# Patient Record
Sex: MALE | Race: ASIAN | NOT HISPANIC OR LATINO | Employment: UNEMPLOYED | URBAN - METROPOLITAN AREA
[De-identification: names, ages, dates, MRNs, and addresses within clinical notes are randomized per-mention and may not be internally consistent; named-entity substitution may affect disease eponyms.]

---

## 2019-05-06 ENCOUNTER — OFFICE VISIT - RIVER FALLS (OUTPATIENT)
Dept: FAMILY MEDICINE | Facility: CLINIC | Age: 29
End: 2019-05-06

## 2019-09-03 ENCOUNTER — OFFICE VISIT - RIVER FALLS (OUTPATIENT)
Dept: FAMILY MEDICINE | Facility: CLINIC | Age: 29
End: 2019-09-03

## 2019-09-03 ASSESSMENT — MIFFLIN-ST. JEOR: SCORE: 1640.72

## 2019-10-04 ENCOUNTER — OFFICE VISIT - RIVER FALLS (OUTPATIENT)
Dept: FAMILY MEDICINE | Facility: CLINIC | Age: 29
End: 2019-10-04

## 2019-10-07 ENCOUNTER — OFFICE VISIT - RIVER FALLS (OUTPATIENT)
Dept: FAMILY MEDICINE | Facility: CLINIC | Age: 29
End: 2019-10-07

## 2019-10-07 ASSESSMENT — MIFFLIN-ST. JEOR: SCORE: 1627.48

## 2020-01-27 ENCOUNTER — OFFICE VISIT - RIVER FALLS (OUTPATIENT)
Dept: FAMILY MEDICINE | Facility: CLINIC | Age: 30
End: 2020-01-27

## 2020-01-27 ASSESSMENT — MIFFLIN-ST. JEOR: SCORE: 1613.51

## 2020-11-06 ENCOUNTER — OFFICE VISIT - RIVER FALLS (OUTPATIENT)
Dept: FAMILY MEDICINE | Facility: CLINIC | Age: 30
End: 2020-11-06

## 2021-03-01 ENCOUNTER — OFFICE VISIT - RIVER FALLS (OUTPATIENT)
Dept: FAMILY MEDICINE | Facility: CLINIC | Age: 31
End: 2021-03-01

## 2021-03-01 ASSESSMENT — MIFFLIN-ST. JEOR: SCORE: 1559.07

## 2021-03-22 ENCOUNTER — COMMUNICATION - RIVER FALLS (OUTPATIENT)
Dept: FAMILY MEDICINE | Facility: CLINIC | Age: 31
End: 2021-03-22

## 2021-04-07 ENCOUNTER — AMBULATORY - RIVER FALLS (OUTPATIENT)
Dept: FAMILY MEDICINE | Facility: CLINIC | Age: 31
End: 2021-04-07

## 2021-05-05 ENCOUNTER — AMBULATORY - RIVER FALLS (OUTPATIENT)
Dept: FAMILY MEDICINE | Facility: CLINIC | Age: 31
End: 2021-05-05

## 2021-06-02 ENCOUNTER — OFFICE VISIT - RIVER FALLS (OUTPATIENT)
Dept: FAMILY MEDICINE | Facility: CLINIC | Age: 31
End: 2021-06-02

## 2021-06-02 ASSESSMENT — MIFFLIN-ST. JEOR: SCORE: 1600.8

## 2021-12-06 ENCOUNTER — OFFICE VISIT - RIVER FALLS (OUTPATIENT)
Dept: FAMILY MEDICINE | Facility: CLINIC | Age: 31
End: 2021-12-06

## 2021-12-08 ENCOUNTER — TRANSCRIBE ORDERS (OUTPATIENT)
Dept: OTHER | Age: 31
End: 2021-12-08

## 2021-12-08 DIAGNOSIS — U09.9 COVID-19 LONG HAULER: Primary | ICD-10-CM

## 2021-12-14 ENCOUNTER — OFFICE VISIT - RIVER FALLS (OUTPATIENT)
Dept: FAMILY MEDICINE | Facility: CLINIC | Age: 31
End: 2021-12-14

## 2021-12-14 ASSESSMENT — MIFFLIN-ST. JEOR: SCORE: 1546.37

## 2021-12-20 ENCOUNTER — COMMUNICATION - RIVER FALLS (OUTPATIENT)
Dept: FAMILY MEDICINE | Facility: CLINIC | Age: 31
End: 2021-12-20

## 2022-02-11 VITALS
HEIGHT: 66 IN | SYSTOLIC BLOOD PRESSURE: 120 MMHG | WEIGHT: 163.08 LBS | DIASTOLIC BLOOD PRESSURE: 80 MMHG | TEMPERATURE: 97.5 F | HEART RATE: 76 BPM | HEIGHT: 66 IN | SYSTOLIC BLOOD PRESSURE: 120 MMHG | WEIGHT: 166 LBS | BODY MASS INDEX: 26.21 KG/M2 | HEART RATE: 118 BPM | TEMPERATURE: 98.7 F | BODY MASS INDEX: 26.68 KG/M2 | OXYGEN SATURATION: 97 % | DIASTOLIC BLOOD PRESSURE: 78 MMHG

## 2022-02-11 VITALS
HEART RATE: 86 BPM | TEMPERATURE: 97.6 F | SYSTOLIC BLOOD PRESSURE: 126 MMHG | DIASTOLIC BLOOD PRESSURE: 72 MMHG | WEIGHT: 165 LBS

## 2022-02-11 VITALS
HEART RATE: 144 BPM | HEIGHT: 66 IN | SYSTOLIC BLOOD PRESSURE: 143 MMHG | TEMPERATURE: 98.9 F | DIASTOLIC BLOOD PRESSURE: 96 MMHG | BODY MASS INDEX: 23.33 KG/M2 | WEIGHT: 145.2 LBS

## 2022-02-11 VITALS
HEART RATE: 115 BPM | OXYGEN SATURATION: 98 % | WEIGHT: 157.2 LBS | SYSTOLIC BLOOD PRESSURE: 102 MMHG | BODY MASS INDEX: 25.26 KG/M2 | TEMPERATURE: 97.5 F | HEIGHT: 66 IN | DIASTOLIC BLOOD PRESSURE: 76 MMHG

## 2022-02-11 VITALS
BODY MASS INDEX: 25.71 KG/M2 | RESPIRATION RATE: 16 BRPM | HEIGHT: 66 IN | TEMPERATURE: 97.8 F | HEART RATE: 100 BPM | WEIGHT: 160 LBS | DIASTOLIC BLOOD PRESSURE: 70 MMHG | OXYGEN SATURATION: 98 % | SYSTOLIC BLOOD PRESSURE: 102 MMHG

## 2022-02-11 VITALS
WEIGHT: 148 LBS | HEART RATE: 70 BPM | DIASTOLIC BLOOD PRESSURE: 62 MMHG | BODY MASS INDEX: 23.78 KG/M2 | HEIGHT: 66 IN | SYSTOLIC BLOOD PRESSURE: 110 MMHG

## 2022-02-16 NOTE — PROGRESS NOTES
Chief Complaint    c/o infection in mouth; tingling in left arm  History of Present Illness      Kareen is a young man with anxiety disorder who presents with a number of complaints today.  He is worried about gum disease after seeing his dentist and subsequently periodontist in the last 2 weeks.  He was told to improve his oral hygiene which is sometimes a problem when he is depressed, does not take care of hygiene as he should.  He had a mild intermittent tingling in the left arm without weakness.  He does have a little neck stiffness but no pain.  No injuries.  No headache weakness or other neurologic complaints.  He smokes and is concerned because he is been having a cough with purulent sputum production.  Is very concerned about his gum disease and his perception that he was not told what to do about it.  Review of Systems      No chest pain, dyspnea, edema, fever, chills, or bowel or bladder symptoms.  Physical Exam   Vitals & Measurements    T: 97.5   F (Tympanic)  HR: 118(Peripheral)  BP: 120/80  SpO2: 97%     HT: 66 in  WT: 163.08 lb  BMI: 26.32       Patient is a very animated young man in no.  Alert and oriented.  Thinking seems normal.  Does not describe any manic symptoms.  HEENT exam does reveal an area of gum disease left upper without associated cellulitis.  Neck is supple no adenopathy or thyromegaly.  Some muscle tenderness bilaterally.  Chest clear.  On neurologic exam strength and sensation are intact throughout both arms.  Assessment/Plan       Acute bronchitis (J20.9)         Antibiotics given his smoking history.         Ordered:          46076 office outpatient visit 25 minutes (Charge), Quantity: 1, Numbness and tingling in left arm  Gum disease  Acute bronchitis  Tobacco Use Disorder, Continuous                Gum disease (K06.9)         Defer to his dentists.         Ordered:          89250 office outpatient visit 25 minutes (Charge), Quantity: 1, Numbness and tingling in left arm  Gum  disease  Acute bronchitis  Tobacco Use Disorder, Continuous                Numbness and tingling in left arm (R20.0)         I cannot demonstrate anything to exam but certainly disc herniation is possible.  I reviewed this with him.  We discussed the option of imaging with an MRI versus giving this a little more time and he prefers the latter option.  Return if there is weakness or is not promptly better.         Ordered:          11802 office outpatient visit 25 minutes (Charge), Quantity: 1, Numbness and tingling in left arm  Gum disease  Acute bronchitis  Tobacco Use Disorder, Continuous                Tobacco Use Disorder, Continuous (F17.209)        Encouraged the patient to quit smoking.          Ordered:          67286 office outpatient visit 25 minutes (Charge), Quantity: 1, Numbness and tingling in left arm  Gum disease  Acute bronchitis  Tobacco Use Disorder, Continuous                Orders:         amoxicillin, = 1 cap(s) ( 500 mg ), Oral, tid, x 10 day(s), # 30 cap(s), 0 Refill(s), Type: Acute, Pharmacy: [facility] #[id number], 1 cap(s) Oral tid,x10 day(s), (Ordered)  Patient Information     Name:TAL LO      Address:      [address] [place] [phone]     Sex:Male     YOB: 1990     [phone]     Emergency Contact:LUCIANA LO     MRN:[id number]     FIN:[id number]     Location:[facility]     Date of Service:10/07/2019      Primary Care Physician:       Brian Jarvis MD, [phone]      Attending Physician:       Iraj Wilder MD, [phone]  Problem List/Past Medical History    Ongoing     Anxiety     Bipolar Disorder, Unspecified     Insomnia     Tobacco Use Disorder, Continuous    Historical     No qualifying data  Medications    amoxicillin 500 mg oral capsule, 500 mg= 1 cap(s), Oral, tid    citalopram 40 mg oral tablet, 40 mg= 1 tab(s), Oral, daily, 1 refills  Allergies    Tegretol  Social History     Smoking Status - 02/23/2011     Yes     Alcohol - Current, 02/01/2011     Exercise      Exercise type: Running., 02/01/2011     Other      Marital Status, Single, 02/01/2011     Tobacco - Current, 02/01/2011  Immunizations      Vaccine Date Status      tetanus/diphth/pertuss (Tdap) adult/adol 04/20/2017 Recorded      Td 06/23/2004 Recorded      Hep B 03/21/2001 Recorded      Hep B 10/24/2000 Recorded      Hep B 09/19/2000 Recorded

## 2022-02-16 NOTE — NURSING NOTE
Comprehensive Intake Entered On:  9/3/2019 3:36 PM CDT    Performed On:  9/3/2019 3:29 PM CDT by Mae Vaughn LPN               Summary   Chief Complaint :   refills for citalopram. would like ears checked, feel plugged.    Weight Measured :   166 lb(Converted to: 166 lb 0 oz, 75.30 kg)    Height Measured :   66 in(Converted to: 5 ft 6 in, 167.64 cm)    Body Mass Index :   26.79 kg/m2 (HI)    Body Surface Area :   1.87 m2   Systolic Blood Pressure :   120 mmHg   Diastolic Blood Pressure :   78 mmHg   Mean Arterial Pressure :   92 mmHg   Peripheral Pulse Rate :   76 bpm   BP Site :   Right arm   Pulse Site :   Radial artery   BP Method :   Manual   HR Method :   Manual   Temperature Tympanic :   98.7 DegF(Converted to: 37.1 DegC)    Mae Vaughn LPN - 9/3/2019 3:29 PM CDT   Health Status   Allergies Verified? :   Yes   Medication History Verified? :   Yes   Pre-Visit Planning Status :   Completed   Mae Vaughn LPN - 9/3/2019 3:29 PM CDT   Consents   Consent for Immunization Exchange :   Consent Granted   Consent for Immunizations to Providers :   Consent Granted   Mae Vaughn LPN - 9/3/2019 3:29 PM CDT   Meds / Allergies   (As Of: 9/3/2019 3:36:18 PM CDT)   Allergies (Active)   Tegretol  Estimated Onset Date:   Unspecified ; Created By:   April Meyer; Reaction Status:   Active ; Category:   Drug ; Substance:   Tegretol ; Type:   Allergy ; Updated By:   April Meyer; Reviewed Date:   9/3/2019 3:33 PM CDT        Medication List   (As Of: 9/3/2019 3:36:18 PM CDT)   Prescription/Discharge Order    citalopram  :   citalopram ; Status:   Prescribed ; Ordered As Mnemonic:   citalopram 40 mg oral tablet ; Simple Display Line:   40 mg, 1 tab(s), PO, Daily, 30 tab(s), 2 Refill(s) ; Ordering Provider:   Brian Jarvis MD; Catalog Code:   citalopram ; Order Dt/Tm:   5/6/2019 2:43:36 PM

## 2022-02-16 NOTE — TELEPHONE ENCOUNTER
"Entered by Charleen Mckeon RN on September 29, 2020 11:22:51 AM CDT  ---------------------  From: Charleen Mckeon RN   To: [facility] #[id number]    Sent: 9/29/2020 11:22:51 AM CDT  Subject: Medication Management     ** Submitted: **  Order:citalopram (citalopram 40 mg oral tablet)  1 tab(s)  Oral  daily  Qty:  30 tab(s)        Days Supply:  30        Refills:  0          Substitutions Allowed     Route To [facility] #[id number]    Signed by Charleen Mckeon RN  9/29/2020 4:22:00 PM [facility]    ** Submitted: **  Complete:citalopram (citalopram 40 mg oral tablet)   Signed by Charleen Mckeon RN  9/29/2020 4:22:00 PM [facility]    ** Not Approved:  **  citalopram (CITALOPRAM 40MG TABLETS)  TAKE 1 TABLET BY MOUTH DAILY  Qty:  30 tab(s)        Days Supply:  30        Refills:  0          Substitutions Allowed     Route To Pharmacy - [facility] #[id number]   Signed by Charleen Mckeon RN            ------------------------------------------  From: [facility] #[id number]  To: Brian Jarvis MD  Sent: September 27, 2020 12:51:48 PM CDT  Subject: Medication Management  Due: September 9, 2020 4:20:39 PM CDT     ** On Hold Pending Signature **     Dispensed Drug: citalopram (citalopram 40 mg oral tablet), TAKE 1 TABLET BY MOUTH DAILY  Quantity: 30 tab(s)  Days Supply: 30  Refills: 0  Substitutions Allowed  Notes from Pharmacy:  ------------------------------------------Patient is past due for med check with [facility]. Placed RTC.  Tried to call patient but listed number \"is not taking calls at this time.\"  Filled 30 day supply. Sent message to Pharmacy.  "

## 2022-02-16 NOTE — NURSING NOTE
Generalized Anxiety Disorder Screening Entered On:  3/1/2021 11:15 AM CST    Performed On:  3/1/2021 11:14 AM CST by Mae Vaughn LPN               Generalized Anxiety Disorder Screening   MIMI Nervous, Anxious On Edge :   Nearly every day   MIMI Control Worrying B :   Nearly every day   MIMI Worrying Too Much :   Nearly every day   MIMI Trouble Relaxing :   Nearly every day   MIMI Restless :   Nearly every day   MIMI Easily Annoyed/Irritable :   Nearly every day   MIMI Afraid :   Nearly every day   MIMI Total Screening Score :   21    MIMI Difficulty with Work, Home, Others :   Extremely difficult   Mae Vaughn LPN - 3/1/2021 11:14 AM CST

## 2022-02-16 NOTE — NURSING NOTE
Comprehensive Intake Entered On:  5/6/2019 2:24 PM CDT    Performed On:  5/6/2019 2:18 PM CDT by Mae Vaughn LPN               Summary   Chief Complaint :   Needs refills of Citalopram. Has been taking 40mg daily.    Weight Measured :   165 lb(Converted to: 165 lb 0 oz, 74.84 kg)    Systolic Blood Pressure :   126 mmHg   Diastolic Blood Pressure :   72 mmHg   Mean Arterial Pressure :   90 mmHg   Peripheral Pulse Rate :   86 bpm   BP Site :   Right arm   Pulse Site :   Radial artery   BP Method :   Manual   HR Method :   Manual   Temperature Tympanic :   97.6 DegF(Converted to: 36.4 DegC)  (LOW)    Mae Vaughn LPN - 5/6/2019 2:18 PM CDT   Health Status   Allergies Verified? :   Yes   Medication History Verified? :   Yes   Mae Vaughn LPN - 5/6/2019 2:18 PM CDT   Consents   Consent for Immunization Exchange :   Consent Granted   Consent for Immunizations to Providers :   Consent Granted   Mae Vaughn LPN - 5/6/2019 2:18 PM CDT   Meds / Allergies   (As Of: 5/6/2019 2:24:33 PM CDT)   Allergies (Active)   Tegretol  Estimated Onset Date:   Unspecified ; Created By:   April Meyer; Reaction Status:   Active ; Category:   Drug ; Substance:   Tegretol ; Type:   Allergy ; Updated By:   April Meyer; Reviewed Date:   5/6/2019 2:21 PM CDT        Medication List   (As Of: 5/6/2019 2:24:33 PM CDT)   Prescription/Discharge Order    citalopram  :   citalopram ; Status:   Prescribed ; Ordered As Mnemonic:   citalopram 20 mg oral tablet ; Simple Display Line:   40 mg, 2 tab(s), PO, Daily, 30 tab(s), 2 Refill(s) ; Ordering Provider:   Brian Jarvis MD; Catalog Code:   citalopram ; Order Dt/Tm:   2/23/2011 11:17:33 AM          quetiapine  :   quetiapine ; Status:   Completed ; Ordered As Mnemonic:   Seroquel 100 mg oral tablet ; Simple Display Line:   See Instructions, 1 tab(s) po at noon and 2 tabs po at bedtime - patient is a [facility] patient, 90 tab(s) ; Ordering Provider:   Radha FIERRO, [place];  Catalog Code:   QUEtiapine ; Order Dt/Tm:   2/23/2011 11:16:58 AM

## 2022-02-16 NOTE — PROGRESS NOTES
Patient:   TAL LO            MRN: [id number]            FIN: [id number]               Age:   31 years     Sex:  Male     :  1990   Associated Diagnoses:   Personality disorder; Sinusitis nasal; Anxiety; COVID-19 long hauler; Bipolar Disorder, Unspecified   Author:   Rico Dalton PA-C      Report Summary   Diagnosis  COVID-19 long hauler (VRX96-TQ U09.9).  Patient InstructionsOrders   Visit Information      Date of Service: 2021 09:20 am  Performing Location: [facility]  Encounter#: [id number]      Primary Care Provider (PCP):  [id number] -UNKNOWN,    NPI# [id number]      Referring Provider:  Rico Dalton PA-C    NPI# [id number]   Visit type:  Video Visit via Mor[employer] or iBuyitBetter.    Participants in room during visit:  2   Location of patient:  :Home  Location of provider:  _ (Clinic office )  Video Start Time:  1045  Video End Time:   1056    Today's visit was conducted via video conference due to the COVID-19 pandemic.  The patient's consent to proceed with a video visit has been obtained and documented.      Chief Complaint   Long Covid symptoms. Sinus infection symptoms acutely.       History of Present Illness   Patient is a 31 year old M who is being evaluated via a billable video visit. One week history of sinus symptoms. Longer term post Covid symptoms. HA, mental cloudiness, fatigue, nausea, ongoing since Covid many months ago.      Review of Systems   Constitutional:  Weakness, Fatigue.    Eye:  Negative.    Ear/Nose/Mouth/Throat:  Nasal congestion.    Respiratory:  Negative.    Cardiovascular:  Negative.    Gastrointestinal:  Negative.    Genitourinary:  Negative.    Immunologic:  Negative.    Musculoskeletal:  Negative.    Integumentary:  Negative.    Neurologic:  Confusion, Headache.    Psychiatric:  Negative.       Health Status   Allergies:    Allergic Reactions (Selected)  Severity Not Documented  Tegretol (No reactions were documented)   Medications:   (Selected)   Prescriptions  Prescribed  albuterol 90 mcg/inh inhalation aerosol: 2 puff(s), Inhale, q4 hrs, PRN: wheezing, # 1 EA, 3 Refill(s), Type: Maintenance, Pharmacy: [facility] #[id number], 2 puff(s) Inhale q4 hrs,PRN:wheezing, 66, in, 06/02/21 10:11:00 CDT, Height Measured, 157.2, lb, 06/02/21 10:11:00 CDT, Weight Winsome...  amoxicillin-clavulanate 875 mg-125 mg oral tablet: = 1 tab(s), Oral, q12 hrs, x 10 day(s), # 20 tab(s), 0 Refill(s), Type: Acute, Pharmacy: [facility] #[id number], 1 tab(s) Oral q12 hrs,x10 day(s), 66, in, 06/02/21 10:11:00 CDT, Height Measured, 157.2, lb, 06/02/21 10:11:00 CDT, Weight Measured  spacer for inhaler: spacer for inhaler, See Instructions, Instructions: use with albuterol inhaler, Supply, # 1 EA, 0 Refill(s), Type: Maintenance, Pharmacy: [facility] #[id number], use with albuterol inhaler   Problem list:    All Problems  Tobacco Use Disorder, Continuous / ICD-9-.1 / Confirmed  Personality disorder / SNOMED CT 69481667 / Confirmed  Insomnia / ICD-9-.52 / Confirmed  Bipolar Disorder, Unspecified / ICD-9-.80 / Confirmed  Bipolar affective disorder, current episode manic / SNOMED CT 527489352 / Confirmed  Anxiety / ICD-9-.00 / Confirmed      Histories   Past Medical History:    Active  Insomnia (780.52)  Bipolar Disorder, Unspecified (296.80)  Anxiety (300.00)  Tobacco Use Disorder, Continuous (305.1)   Family History:    No family history items have been selected or recorded.   Procedure history:    No active procedure history items have been selected or recorded.   Social History:        Electronic Cigarette/Vaping Assessment            Electronic Cigarette Use: Never.      Alcohol Assessment: Current      Tobacco Assessment: Current            5-9 cigarettes (between 1/4 to 1/2 pack)/day in last 30 days, Cigarettes, 13 year(s).      Exercise and Physical Activity Assessment            Exercise type: Running.      Other Assessment             Marital Status, Single        Physical Examination   General:  Alert and oriented, No acute distress.    Eye:  Pupils are equal, round and reactive to light, Normal conjunctiva.    HENT:  Oral mucosa is moist.    Neck:  Supple.    Respiratory:  Respirations are non-labored.    Psychiatric:  Cooperative, Appropriate mood & affect, Normal judgment.       Impression and Plan   Diagnosis     Personality disorder (QVM65-HL F60.9).     Sinusitis nasal (CBT55-PX J32.9).     Anxiety (DIU76-CD F41.9).     COVID-19 long hauler (PMD81-EO U09.9).     Bipolar Disorder, Unspecified (GCL21-EZ F31.9).     Patient Instructions:       Counseled: Patient, Regarding diagnosis, Regarding treatment, Regarding medications, Verbalized understanding.    Orders     Orders (Selected)   Outpatient Orders  Ordered  Referral (Request): 12/06/21 10:54:00 CST, Referred to: Other, Referred to: [facility], Reason for referral: Long Covid symptoms, COVID-19 long hauler  Prescriptions  Prescribed  amoxicillin-clavulanate 875 mg-125 mg oral tablet: = 1 tab(s), Oral, q12 hrs, x 10 day(s), # 20 tab(s), 0 Refill(s), Type: Acute, Pharmacy: [facility] #[id number], 1 tab(s) Oral q12 hrs,x10 day(s), 66, in, 06/02/21 10:11:00 CDT, Height Measured, 157.2, lb, 06/02/21 10:11:00 CDT, Weight Measured.     Take medicine as prescribed, side effects discussed.  Tylenol/ibuprofen for fever and discomfort.  Push fluids.  RTC if not improving in 36-48 hours, prior if concerns as we have discussed.        Health Maintenance      Recommendations     Pending (in the next year)        OverDue           Influenza Vaccine due  09/01/21  and every 1  year(s)        Due            HIV Screen (if sexually active) due  12/06/21  and every 1  year(s)           STD Counseling (if sexually active) due  12/06/21  and every 1  year(s)           Syphilis Screen (if sexually active) due  12/06/21  and every 1  year(s)        Due In Future            Alcohol Misuse Screen not  due until  03/01/22  and every 1  year(s)           Depression Screen not due until  03/01/22  and every 1  year(s)           Body Mass Index Check not due until  06/02/22  and every 1  year(s)           High Blood Pressure Screen not due until  06/02/22  and every 1  year(s)     Satisfied (in the past 1 year)        Satisfied            Alcohol Misuse Screen on  03/01/21.           Alcohol Misuse Screen on  03/01/21.           Body Mass Index Check on  06/02/21.           Body Mass Index Check on  03/01/21.           COVID-19 Vaccine (Moderna) Dose 2 on  05/05/21.           COVID-19 Vaccine (Moderna) Dose 1 on  04/07/21.           Depression Screen on  03/01/21.           Depression Screen on  03/01/21.           Depression Screen on  03/01/21.           High Blood Pressure Screen on  06/02/21.           High Blood Pressure Screen on  03/01/21.

## 2022-02-16 NOTE — NURSING NOTE
Comprehensive Intake Entered On:  6/2/2021 10:16 AM CDT    Performed On:  6/2/2021 10:11 AM CDT by Martita Chambers LPN               Summary   Chief Complaint :   lungs hurt, diarrhea for months, bodyaches, HA, nausea, vomiting, coughing, unsure if any fever, chills, brain fog, numbness of right shoulder and upper arm, says that he has had symptoms for the past two months   Weight Measured :   157.2 lb(Converted to: 157 lb 3 oz, 71.305 kg)    Height Measured :   66 in(Converted to: 5 ft 6 in, 167.64 cm)    Body Mass Index :   25.37 kg/m2 (HI)    Body Surface Area :   1.82 m2   Systolic Blood Pressure :   102 mmHg   Diastolic Blood Pressure :   76 mmHg   Mean Arterial Pressure :   85 mmHg   Peripheral Pulse Rate :   115 bpm (HI)    BP Site :   Right arm   BP Method :   Manual   Temperature Tympanic :   97.5 DegF(Converted to: 36.4 DegC)  (LOW)    Oxygen Saturation :   98 %   Martita Chambers LPN - 6/2/2021 10:11 AM CDT   Health Status   Allergies Verified? :   Yes   Medication History Verified? :   Yes   Medical History Verified? :   No   Pre-Visit Planning Status :   Completed   Tobacco Use? :   Current every day smoker   Martita Chambers LPN - 6/2/2021 10:11 AM CDT   Meds / Allergies   (As Of: 6/2/2021 10:16:43 AM CDT)   Allergies (Active)   Tegretol  Estimated Onset Date:   Unspecified ; Created By:   April Meyer; Reaction Status:   Active ; Category:   Drug ; Substance:   Tegretol ; Type:   Allergy ; Updated By:   April Meyer; Reviewed Date:   3/1/2021 10:41 AM CST        Medication List   (As Of: 6/2/2021 10:16:43 AM CDT)   Prescription/Discharge Order    Miscellaneous Rx Supply  :   Miscellaneous Rx Supply ; Status:   Prescribed ; Ordered As Mnemonic:   spacer for inhaler ; Simple Display Line:   See Instructions, use with albuterol inhaler, 1 EA, 0 Refill(s) ; Ordering Provider:   Troy Crisostomo PA-C; Catalog Code:   Miscellaneous Rx Supply ; Order Dt/Tm:   1/27/2020 11:02:06 AM CST           albuterol  :   albuterol ; Status:   Prescribed ; Ordered As Mnemonic:   albuterol 90 mcg/inh inhalation aerosol ; Simple Display Line:   See Instructions, INHALE 2 PUFFS EVERY 4 HOURS AS NEEDED FOR WHEEZING. USE WITH SPACER., 18 gm, 3 Refill(s) ; Ordering Provider:   Iraj Wilder MD; Catalog Code:   albuterol ; Order Dt/Tm:   12/22/2020 8:40:51 AM CST          guaiFENesin  :   guaiFENesin ; Status:   Prescribed ; Ordered As Mnemonic:   Mucinex 600 mg oral tablet, extended release ; Simple Display Line:   600 mg, 1 tab(s), Oral, q12 hrs, 20 tab(s), 0 Refill(s) ; Ordering Provider:   Troy Crisostomo PA-C; Catalog Code:   guaiFENesin ; Order Dt/Tm:   1/27/2020 11:03:28 AM CST          lurasidone  :   lurasidone ; Status:   Prescribed ; Ordered As Mnemonic:   Latuda 40 mg oral tablet ; Simple Display Line:   40 mg, 1 tab(s), Oral, daily, 30 tab(s), 2 Refill(s) ; Ordering Provider:   Brian Jarvis MD; Catalog Code:   lurasidone ; Order Dt/Tm:   3/22/2021 2:41:18 PM CDT          DULoxetine  :   DULoxetine ; Status:   Prescribed ; Ordered As Mnemonic:   DULoxetine 20 mg oral delayed release capsule ; Simple Display Line:   20 mg, 1 cap(s), Oral, daily, 30 cap(s), 2 Refill(s) ; Ordering Provider:   Brian Jarvis MD; Catalog Code:   DULoxetine ; Order Dt/Tm:   3/22/2021 2:41:18 PM CDT            ID Risk Screen   Recent Travel History :   No recent travel   Family Member Travel History :   No recent travel   Other Exposure to Infectious Disease :   Unknown   COVID-19 Testing Status :   No positive COVID-19 test   Martita Chambers LPN - 6/2/2021 10:11 AM CDT

## 2022-02-16 NOTE — NURSING NOTE
Comprehensive Intake Entered On:  10/7/2019 2:21 PM CDT    Performed On:  10/7/2019 2:17 PM CDT by Lora Barone               Summary   Chief Complaint :   c/o infection in mouth; right tingling in arm   Weight Measured :   163.08 lb(Converted to: 163 lb 1 oz, 73.97 kg)    Height Measured :   66 in(Converted to: 5 ft 6 in, 167.64 cm)    Body Mass Index :   26.32 kg/m2 (HI)    Body Surface Area :   1.85 m2   Systolic Blood Pressure :   120 mmHg   Diastolic Blood Pressure :   80 mmHg   Mean Arterial Pressure :   93 mmHg   Peripheral Pulse Rate :   118 bpm (HI)    BP Site :   Right arm   BP Method :   Manual   HR Method :   Electronic   Temperature Tympanic :   97.5 DegF(Converted to: 36.4 DegC)  (LOW)    Oxygen Saturation :   97 %   Lora Barone - 10/7/2019 2:17 PM CDT   Health Status   Allergies Verified? :   Yes   Medication History Verified? :   Yes   Medical History Verified? :   Yes   Pre-Visit Planning Status :   Completed   Lora Barone - 10/7/2019 2:17 PM CDT   Consents   Consent for Immunization Exchange :   Consent Granted   Consent for Immunizations to Providers :   Consent Granted   Lora Barone - 10/7/2019 2:17 PM CDT   Meds / Allergies   (As Of: 10/7/2019 2:21:55 PM CDT)   Allergies (Active)   Tegretol  Estimated Onset Date:   Unspecified ; Created By:   April Meyer; Reaction Status:   Active ; Category:   Drug ; Substance:   Tegretol ; Type:   Allergy ; Updated By:   April Meyer; Reviewed Date:   10/7/2019 2:19 PM CDT        Medication List   (As Of: 10/7/2019 2:21:55 PM CDT)   Prescription/Discharge Order    citalopram  :   citalopram ; Status:   Prescribed ; Ordered As Mnemonic:   citalopram 40 mg oral tablet ; Simple Display Line:   40 mg, 1 tab(s), PO, Daily, 90 tab(s), 1 Refill(s) ; Ordering Provider:   Brian Jarvis MD; Catalog Code:   citalopram ; Order Dt/Tm:   9/3/2019 4:08:34 PM CDT

## 2022-02-16 NOTE — TELEPHONE ENCOUNTER
Entered by Lora Barone on July 22, 2020 8:14:17 AM CDT  PCP:   REMI    Medication:   Citalopram 40mg   Last Filled:  4/13/20   Quantity:  30 Refills:  0    Date of last office visit and reason:   1/27/20 Pneumonia   Date of last labs pertaining to condition:  _    Note:  Please advise. Patient has not had this med for a couple months     Return to Clinic order placed?  Yes. Over due for med check.     Resource:   _  Phone:   _            ------------------------------------------  From: [facility] #[id number]  To: Brian Jarvis MD  Sent: July 20, 2020 1:01:03 PM CDT  Subject: Medication Management  Due: July 21, 2020 12:31:28 PM CDT     ** On Hold Pending Signature **     Dispensed Drug: citalopram (citalopram 40 mg oral tablet), TAKE 1 TABLET BY MOUTH DAILY  Quantity: 30 tab(s)  Days Supply: 30  Refills: 0  Substitutions Allowed  Notes from Pharmacy:  ---------------------------------------------------------------  From: Lora Barone (eRx Pool (32224_[facility]))   To: [employer] Message Pool (32224_[place] [facility] [place]);     Sent: 7/22/2020 8:14:21 AM CDT  Subject: FW: Medication Management   Due Date/Time: 7/21/2020 1:01:00 PM CDT---------------------  From: Mae Vaughn LPN ([employer] Message Pool (32224_[facility]))   To: Brian Jarvis MD;     Sent: 7/22/2020 8:16:19 AM CDT  Subject: FW: Medication Management   Due Date/Time: 7/21/2020 1:01:00 PM CDT---------------------  From: Brian Jarvis MD   To: [facility] #[id number]    Sent: 7/22/2020 1:07:35 PM CDT  Subject: FW: Medication Management     ** Submitted: **  Complete:citalopram (citalopram 40 mg oral tablet)   Signed by Brian Jarvis MD  7/22/2020 6:07:00 PM [facility]    ** Approved with modifications: **  citalopram (CITALOPRAM 40MG TABLETS)  TAKE 1 TABLET BY MOUTH DAILY  Qty:  30 tab(s)        Days Supply:  30        Refills:  2          Substitutions Allowed     Route To Pharmacy - [facility] #[id number]

## 2022-02-16 NOTE — TELEPHONE ENCOUNTER
Entered by Virginie Rincon CMA on December 22, 2020 8:41:06 AM CST  ---------------------  From: Virginie Rincon CMA   To: [facility] #[id number]    Sent: 12/22/2020 8:41:05 AM CST  Subject: Medication Management     ** Submitted: **  Order:albuterol (albuterol 90 mcg/inh inhalation aerosol)  See Instructions  INHALE 2 PUFFS EVERY 4 HOURS AS NEEDED FOR WHEEZING. USE WITH SPACER.  Qty:  18 gm        Days Supply:  16        Refills:  3          Substitutions Allowed     Route To [facility] #[id number]    Signed by Virginie Rincon CMA  12/22/2020 2:39:00 PM [facility]    ** Submitted: **  Complete:albuterol (albuterol 90 mcg/inh inhalation aerosol)   Signed by Virginie Rincon CMA  12/22/2020 2:41:00 PM [facility]    ** Not Approved:  **  albuterol (ALBUTEROL HFA INH(200 PUFFS)18GM)  INHALE 2 PUFFS EVERY 4 HOURS AS NEEDED FOR WHEEZING. USE WITH SPACER.  Qty:  18 gm        Days Supply:  16        Refills:  0          Substitutions Allowed     Route To [facility] #[id number]   Signed by Virginie Rincon CMA          last seen 11/6  [facility] 5/2021      ------------------------------------------  From: [facility] #[id number]  To: Iraj Wilder MD  Sent: December 20, 2020 3:43:20 AM CST  Subject: Medication Management  Due: December 16, 2020 8:25:31 PM CST     ** On Hold Pending Signature **     Dispensed Drug: albuterol (albuterol 90 mcg/inh inhalation aerosol), INHALE 2 PUFFS EVERY 4 HOURS AS NEEDED FOR WHEEZING. USE WITH SPACER.  Quantity: 18 gm  Days Supply: 16  Refills: 0  Substitutions Allowed  Notes from Pharmacy:  ------------------------------------------

## 2022-02-16 NOTE — LETTER
(Inserted Image. Unable to display)     July 08, 2019      TAL LO  [address] [place], [place] [id number]          Dear TAL,      Thank you for selecting [facility] ([facility]viously [facility]) for your healthcare needs.      Our records indicate you are due for the following services:     Follow-up office visit / Medication Check.      To schedule an appointment or if you have further questions, please contact your primary clinic:   [facility]       [phone]   [facility]       [phone]              [facility]     [phone]      Powered by [employer]    Sincerely,    Brian Jarvis MD

## 2022-02-16 NOTE — PROGRESS NOTES
Patient:   TAL LO            MRN: [id number]            FIN: [id number]               Age:   29 years     Sex:  Male     :  1990   Associated Diagnoses:   Pneumonia   Author:   Troy Crisostomo PA-C      Chief Complaint   2020 10:48 AM CST   Pt here for productive cough x week.        History of Present Illness   Chief complaint and symptoms noted above and confirmed with patient   productive cough for a week, has had chills and sweats  using cough suppressant, cough drops, albuterol inhaler  still smoking a few cigarettes daily         Review of Systems   Constitutional:  Chills, Sweats.    Ear/Nose/Mouth/Throat:  Nasal congestion, Sore throat.    Respiratory:  Cough, Sputum production, Wheezing.    Gastrointestinal:  Diarrhea, No vomiting.       Health Status   Allergies:    Allergic Reactions (All)  Severity Not Documented  Tegretol (No reactions were documented)   Medications:  (Selected)   Prescriptions  Prescribed  citalopram 40 mg oral tablet: = 1 tab(s) ( 40 mg ), PO, Daily, # 90 tab(s), 1 Refill(s), Type: Maintenance, Pharmacy: [facility] #[id number], 1 tab(s) Oral daily  Documented Medications  Documented  albuterol 90 mcg/inh inhalation aerosol: 2 puff(s), Inhale, q6 hrs, 0 Refill(s), Type: Maintenance   Problem list:    All Problems  Bipolar Disorder, Unspecified / ICD-9-.80 / Confirmed  Anxiety / ICD-9-.00 / Confirmed  Tobacco Use Disorder, Continuous / ICD-9-.1 / Confirmed  Insomnia / ICD-9-.52 / Confirmed      Histories   Past Medical History:    Active  Insomnia (780.52)  Bipolar Disorder, Unspecified (296.80)  Anxiety (300.00)  Tobacco Use Disorder, Continuous (305.1)   Family History:    No family history items have been selected or recorded.   Procedure history:    No active procedure history items have been selected or recorded.      Physical Examination   Vital Signs   2020 10:48 AM CST Temperature Tympanic 97.8 DegF  LOW    Peripheral Pulse  Rate 100 bpm    Pulse Site Radial artery    Respiratory Rate 16 br/min    Systolic Blood Pressure 102 mmHg    Diastolic Blood Pressure 70 mmHg    Mean Arterial Pressure 81 mmHg    BP Site Right arm    Oxygen Saturation 98 %      Measurements from flowsheet : Measurements   1/27/2020 10:48 AM CST Height Measured - Standard 66 in    Weight Measured - Standard 160 lb    BSA 1.84 m2    Body Mass Index 25.82 kg/m2  HI      General:  No acute distress.    HENT:  Tympanic membranes are clear, No pharyngeal erythema, No sinus tenderness, nares are patent.    Neck:  Supple, No lymphadenopathy, slight tenderness.    Respiratory:  lungs have coarse ronchi bilaterally, no wheezes, bilateral  rales.    Cardiovascular:  Normal rate, Regular rhythm, No murmur.       Impression and Plan   Diagnosis     Pneumonia (ZXA03-CO J18.9).     Patient Instructions:       Counseled: Regarding smoking cessation.    Summary:  will treat with zpak, burst of prednisone, mucinex, continue with albuterol MDI  follow up if not improving.    Orders     Orders   Pharmacy:  Mucinex 600 mg oral tablet, extended release (Prescribe): = 1 tab(s) ( 600 mg ), Oral, q12 hrs, # 20 tab(s), 0 Refill(s), Type: Maintenance, Pharmacy: [facility] #[id number], 1 tab(s) Oral q12 hrs  predniSONE 20 mg oral tablet (Prescribe): = 2 tab(s) ( 40 mg ), Oral, daily, x 5 day(s), Instructions: with food or milk, # 10 tab(s), 0 Refill(s), Type: Acute, Pharmacy: [facility] #[id number], 2 tab(s) Oral daily,x5 day(s),Instr:with food or milk  Zithromax Z-Anshul 250 mg oral tablet (Prescribe): Take 2 tablets on Day 1 and then 1 tablet, Oral, daily, Instructions: until finished, # 6 tab(s), 0 Refill(s), Type: Maintenance, Pharmacy: [facility] #[id number], Take 2 tablets on Day 1 and then 1 tablet Oral daily,Instr:until finished  spacer for inhaler (Prescribe): spacer for inhaler, See Instructions, Instructions: use with albuterol inhaler, Supply, # 1 EA, 0 Refill(s),  Type: Maintenance, [id number], use with albuterol inhaler  albuterol 90 mcg/inh inhalation aerosol (Prescribe): 2 puff(s) ( 180 mcg ), Inhale, q6 hrs, Instructions: use with spacer chamber, PRN: as needed for wheezing, # 1 EA, 2 Refill(s), Type: Maintenance, Pharmacy: [facility] #[id number], 2 puff(s) Inhale q6 hrs,PRN:as needed for wheezing,Instr:use with spacer chamber  albuterol 90 mcg/inh inhalation aerosol (Discontinue).     Orders   Charges (Evaluation and Management):  [id number] office outpatient visit 15 minutes (Charge) (Order): Quantity: 1, Pneumonia.

## 2022-02-16 NOTE — NURSING NOTE
Generalized Anxiety Disorder Screening Entered On:  9/5/2019 9:24 AM CDT    Performed On:  9/3/2019 9:23 AM CDT by Radha Mchugh               Generalized Anxiety Disorder Screening   MIMI Nervous, Anxious On Edge :   Nearly every day   MIMI Control Worrying B :   Nearly every day   MIMI Worrying Too Much :   Nearly every day   MIMI Restless :   Nearly every day   MIMI Easily Annoyed/Irritable :   Nearly every day   MIMI Afraid :   Several days   MIMI Trouble Relaxing :   Nearly every day   MIMI Total Screening Score :   19    MIMI Difficulty with Work, Home, Others :   Very difficult   Radha Mchugh - 9/5/2019 9:23 AM CDT

## 2022-02-16 NOTE — LETTER
(Inserted Image. Unable to display)   [address], [place]  [id number] [address], 2021      TAL LO  [address] [place] [address]        Dear TAL,      Thank you for selecting [facility] for your healthcare needs.      Your head CT is normal.  No abnormalities were found.          Please contact me or my assistant at [phone] if you have any questions or concerns.     Sincerely,        Brian Jarvis MD

## 2022-02-16 NOTE — PROGRESS NOTES
"Chief Complaint    Needs refills of Citalopram. Has been taking 40mg daily.  History of Present Illness      Patient here for medication refill has tried different medication but feels that current dose of Citalopram 40mg does help from deep depression.  Was seeing psychiatrist in [place], needs to find a new one that his insurance covers.  Has been out of medication for the past week. Does not have a job currently but is going to try and start going to school this summer.  Review of Systems      \"See HPI.  All other review of systems negative.\"  Physical Exam   Vitals & Measurements    T: 97.6   F (Tympanic)  HR: 86(Peripheral)  BP: 126/72     WT: 165 lb           General:  Alert and oriented, No acute distress.            Eye:  Normal conjunctiva.            Respiratory:  Respirations are non-labored.            Cardiovascular:  Normal rate           Gastrointestinal:  Non-distended.            Musculoskeletal:  Normal gait.            Integumentary:  Warm, No rash.            Psychiatric:  Cooperative, Appropriate mood & affect, Normal judgment.         PhQ-9 score 15      MIMI-7 score 15  Assessment/Plan       1. Bipolar Disorder, Unspecified (F31.9)        Referral placed for psychiatry.         Ordered:          Referral (Request), 05/06/19 14:42:00 CDT, Referred to: Psychiatry, Bipolar Disorder, Unspecified  Depression                2. Depression (F32.9)        Start with half tablet for one week and then can take the 40mg tablet.  Follow up in 2-3 months.         Ordered:          Referral (Request), 05/06/19 14:42:00 CDT, Referred to: Psychiatry, Bipolar Disorder, Unspecified  Depression                Orders:         citalopram, = 1 tab(s) ( 40 mg ), PO, Daily, # 30 tab(s), 2 Refill(s), Type: Maintenance, [id number], 1 tab(s) Oral daily, (Ordered)         Return to Clinic (Request), RFV: Medication check, Return in 2 months      Mae OLSEN LPN, acted solely as a " scribe for, and in the presence of Dr. Brian Jarvis who performed the services.  Patient Information     Name:TAL LO      Address:      [address] [place] [address]     Sex:Male     YOB: 1990     [phone]     Emergency Contact:[facility], CONTACT     MRN:[id number]     FIN:[id number]     Location:[facility]     Date of Service:05/06/2019      Primary Care Physician:       NONE ,       Attending Physician:       Brian Jarvis MD, [phone]  Problem List/Past Medical History    Ongoing     Anxiety     Bipolar Disorder, Unspecified     Insomnia     Tobacco Use Disorder, Continuous    Historical     No qualifying data  Medications     citalopram 40 mg oral tablet: 40 mg, 1 tab(s), PO, Daily, 30 tab(s), 2 Refill(s).      Allergies    Tegretol  Social History    Smoking Status - 02/23/2011     Yes     Alcohol - Current, 02/01/2011     Exercise and Physical Activity      Exercise type: Running., 02/01/2011     Other      Marital Status, Single, 02/01/2011     Tobacco - Current, 02/01/2011  Immunizations      Vaccine Date Status      Td 06/23/2004 Recorded      Hep B 03/21/2001 Recorded      Hep B 10/24/2000 Recorded      Hep B 09/19/2000 Recorded

## 2022-02-16 NOTE — NURSING NOTE
CAGE Assessment Entered On:  3/1/2021 11:14 AM CST    Performed On:  3/1/2021 11:14 AM CST by Mae Vaughn LPN               Assessment   Have you ever felt you should cut down on your drinking :   No   Have people annoyed you by criticizing your drinking :   No   Have you ever felt bad or guilty about your drinking :   No   Have you ever taken a drink first thing in the morning to steady your nerves or get rid of a hangover (Eye-opener) :   No   CAGE Score :   0    Mae Vaughn LPN - 3/1/2021 11:14 AM CST

## 2022-02-16 NOTE — NURSING NOTE
Comprehensive Intake Entered On:  3/1/2021 10:42 AM CST    Performed On:  3/1/2021 10:35 AM CST by Mae Vaughn LPN               Summary   Chief Complaint :   referral for psychology, concerns with depression.    Weight Measured :   148 lb(Converted to: 148 lb 0 oz, 67.132 kg)    Height Measured :   66 in(Converted to: 5 ft 6 in, 167.64 cm)    Body Mass Index :   23.89 kg/m2   Body Surface Area :   1.77 m2   Systolic Blood Pressure :   110 mmHg   Diastolic Blood Pressure :   62 mmHg   Mean Arterial Pressure :   78 mmHg   Peripheral Pulse Rate :   70 bpm   BP Site :   Right arm   Pulse Site :   Radial artery   BP Method :   Manual   HR Method :   Manual   Mae Vaughn LPN - 3/1/2021 10:35 AM CST   Health Status   Allergies Verified? :   Yes   Medication History Verified? :   Yes   Pre-Visit Planning Status :   Completed   Mae Vaughn LPN - 3/1/2021 10:35 AM CST   Consents   Consent for Immunization Exchange :   Consent Granted   Consent for Immunizations to Providers :   Consent Granted   Mae Vaughn LPN - 3/1/2021 10:35 AM CST   Meds / Allergies   (As Of: 3/1/2021 10:42:13 AM CST)   Allergies (Active)   Tegretol  Estimated Onset Date:   Unspecified ; Created By:   April Meyer; Reaction Status:   Active ; Category:   Drug ; Substance:   Tegretol ; Type:   Allergy ; Updated By:   April Meyer; Reviewed Date:   3/1/2021 10:41 AM CST        Medication List   (As Of: 3/1/2021 10:42:13 AM CST)   Prescription/Discharge Order    albuterol  :   albuterol ; Status:   Prescribed ; Ordered As Mnemonic:   albuterol 90 mcg/inh inhalation aerosol ; Simple Display Line:   See Instructions, INHALE 2 PUFFS EVERY 4 HOURS AS NEEDED FOR WHEEZING. USE WITH SPACER., 18 gm, 3 Refill(s) ; Ordering Provider:   Iraj Wilder MD; Catalog Code:   albuterol ; Order Dt/Tm:   12/22/2020 8:40:51 AM CST          citalopram  :   citalopram ; Status:   Completed ; Ordered As Mnemonic:   citalopram 40 mg oral  tablet ; Simple Display Line:   1 tab(s), Oral, daily, 30 tab(s), 5 Refill(s) ; Ordering Provider:   Iraj Wilder MD; Catalog Code:   citalopram ; Order Dt/Tm:   11/6/2020 2:00:06 PM CST          guaiFENesin  :   guaiFENesin ; Status:   Prescribed ; Ordered As Mnemonic:   Mucinex 600 mg oral tablet, extended release ; Simple Display Line:   600 mg, 1 tab(s), Oral, q12 hrs, 20 tab(s), 0 Refill(s) ; Ordering Provider:   Troy Crisostomo PA-C; Catalog Code:   guaiFENesin ; Order Dt/Tm:   1/27/2020 11:03:28 AM CST          Miscellaneous Rx Supply  :   Miscellaneous Rx Supply ; Status:   Prescribed ; Ordered As Mnemonic:   spacer for inhaler ; Simple Display Line:   See Instructions, use with albuterol inhaler, 1 EA, 0 Refill(s) ; Ordering Provider:   Troy Crisostomo PA-C; Catalog Code:   Miscellaneous Rx Supply ; Order Dt/Tm:   1/27/2020 11:02:06 AM CST            Home Meds    DULoxetine  :   DULoxetine ; Status:   Documented ; Ordered As Mnemonic:   DULoxetine 20 mg oral delayed release capsule ; Simple Display Line:   20 mg, 1 cap(s), Oral, daily, 0 Refill(s) ; Catalog Code:   DULoxetine ; Order Dt/Tm:   3/1/2021 10:40:18 AM CST          lurasidone  :   lurasidone ; Status:   Documented ; Ordered As Mnemonic:   Latuda 40 mg oral tablet ; Simple Display Line:   40 mg, 1 tab(s), Oral, daily, 0 Refill(s) ; Catalog Code:   lurasidone ; Order Dt/Tm:   3/1/2021 10:40:31 AM CST            ID Risk Screen   Recent Travel History :   No recent travel   Family Member Travel History :   No recent travel   Other Exposure to Infectious Disease :   Unknown   COVID-19 Testing Status :   No positive COVID-19 test   Mae Vaughn LPN 3/1/2021 10:35 AM CST   Social History   Social History   (As Of: 3/1/2021 10:42:13 AM CST)   Alcohol:  Current      (Last Updated: 2/1/2011 10:07:37 AM CST by April Cornejo )         Tobacco:  Current      5-9 cigarettes (between 1/4 to 1/2 pack)/day in last 30 days, Cigarettes, 13 year(s).    (Last Updated: 3/1/2021 10:36:14 AM CST by Mae Vaughn LPN)          Electronic Cigarette/Vaping:        Electronic Cigarette Use: Never.   (Last Updated: 3/1/2021 10:39:47 AM CST by Mae Vaughn LPN)          Exercise:        Exercise type: Running.   (Last Updated: 2/1/2011 10:07:48 AM CST by April Cornejo)          Other:        Marital Status, Single   (Last Updated: 2/1/2011 10:08:09 AM CST by April Cornejo)

## 2022-02-16 NOTE — PROGRESS NOTES
Chief Complaint    referral for psychology, concerns with depression.  History of Present Illness       Patient is here for follow-up from recent hospitalization for worsening of his bipolar disorder and anxiety.  He was hospitalized for 5 days.  Duloxetine, lurasidone, and terazosin were added at discharge.  He is tolerating medication.  He has an emergency plan in place.  He denies significant suicidal ideation.  PHQ-9 and MIMI-7 reviewed and both are elevated.  He is in need of a referral for behavioral therapy.  He has follow-up scheduled psychiatry.       Discharge information and medication reviewed and reconciled.  Review of Systems       See HPI.  All other review of systems negative.  Physical Exam   Vitals & Measurements    HR: 70 (Peripheral)  BP: 110/62     HT: 66 in  WT: 148 lb  BMI: 23.89        Alert, oriented, no acute distress       Her heart rate       Nonlabored breathing       Cooperative, appropriate affect  Assessment/Plan       1. Bipolar Disorder, Unspecified (F31.9)         He will continue his current medication.  Referral made for behavioral therapy.  Follow-up as needed.          Ordered:          Referral (Request), 03/01/21 10:59:00 CST, Referred to: Behavioral Health, Bipolar Disorder, Unspecified           Patient Information     Name:TAL LO      Address:      [address] [place] [id number]     Sex:Male     YOB: 1990     [phone]     Emergency Contact:LUCIANA LO     MRN:[id number]     FIN:[id number]     Location:[facility]     Date of Service:03/01/2021      Primary Care Physician:       Brian Jarvis MD, [phone]      Attending Physician:       Brian Jarvis MD, [phone]  Problem List/Past Medical History    Ongoing     Anxiety     Bipolar affective disorder, current episode manic     Bipolar Disorder, Unspecified     Insomnia     Personality disorder     Tobacco Use Disorder, Continuous     Historical     No qualifying data  Medications    albuterol 90 mcg/inh inhalation aerosol, See Instructions, 3 refills    DULoxetine 20 mg oral delayed release capsule, 20 mg= 1 cap(s), Oral, daily    Latuda 40 mg oral tablet, 40 mg= 1 tab(s), Oral, daily    Mucinex 600 mg oral tablet, extended release, 600 mg= 1 tab(s), Oral, q12 hrs    spacer for inhaler, See Instructions  Allergies    Tegretol  Social History    Smoking Status     Current every day smoker     Alcohol - Current     Electronic Cigarette/Vaping      Electronic Cigarette Use: Never.     Exercise      Exercise type: Running.     Other      Marital Status, Single     Tobacco - Current      5-9 cigarettes (between 1/4 to 1/2 pack)/day in last 30 days, Cigarettes, 13 year(s).  Immunizations      Vaccine Date Status          tetanus/diphth/pertuss (Tdap) adult/adol 04/20/2017 Recorded          Td 06/23/2004 Recorded          Hep B 03/21/2001 Recorded          Hep B 10/24/2000 Recorded          Hep B 09/19/2000 Recorded

## 2022-02-16 NOTE — PROGRESS NOTES
Chief Complaint    c/o having panic attacks since having head injury 11/25/2021, waking up w/ cold sweats, has been seen in ER over the weekend.  also has long term mold exposure.  History of Present Illness       Head injury x2 8hours apart 3 weeks ago       No evaluation       Patient is here today with concerns about panic attacks.  He was seen in the emergency department over the weekend.  He reports couple other visits as well.  He also has concerns about head injury.  He reports striking his head after slipping on the ice twice and about 8 hours 3 weeks ago.  Since that time he reports his thinking is not as clear as he would like.  He is worried about skull injury and concussion.  He had no medical attention at the time of his injury.       He has a history of bipolar affective disorder and personality disorder.  In the last 6 months he was hospitalized for suicidal ideation.  He is not taking any psychotropic medicines at this time.  Review of Systems          ROS reviewed and negative except for symptoms noted in HPI.  Physical Exam   Vitals & Measurements    T: 98.9  F (Tympanic)  HR: 144 (Peripheral)  BP: 143/96     HT: 66 in  WT: 145.2 lb  BMI: 23.43             General:  Alert and oriented, No acute distress.             Eye: Normal conjunctiva.             HENT:  Oral mucosa is moist.             Neck:  Supple.             Respiratory:  Respirations are non-labored.             Cardiovascular:  Normal rate           Musculoskeletal:  Normal gait.           Neurological: Normal coordination Station and gait, cranial nerves II through XII intact           Psychiatric:  Cooperative, Appropriate mood & affect, Normal judgment.  Assessment/Plan       1. Anxiety (F41.9)          treat with hydroxyzine and follow-up pending CT results.                2. Post-concussion syndrome (F07.81)          CT head without contrast ordered for evaluation of his recent head injury and persistent postconcussion syndrome          Ordered:          CT Head w/o Contrast (Request), Post-concussion syndrome          Review Orders for Potential Authorizations, 12/14/21 12:28:08 CST                Orders:         hydrOXYzine, = 1 tab(s) ( 50 mg ), Oral, qid, PRN: for anxiety, # 40 tab(s), 0 Refill(s), Type: Acute, Pharmacy: [id number], 1 tab(s) Oral qid,PRN:for anxiety, 66, in, 12/14/21 11:24:00 CST, Height Measured, 145.2, lb, 12/14/21 11:24:00 CST, Weigh..., (Ordered)  Patient Information     Name:TAL LO      Address:      [address] [place] [id number]     Sex:Male     YOB: 1990     [phone]     Emergency Contact:LUCIANA LO     MRN:[id number]     FIN:[id number]     Location:[facility]     Date of Service:12/14/2021      Primary Care Physician:       Brian Jarvis MD, [phone]      Attending Physician:       Brian Jarvis MD, [phone]  Problem List/Past Medical History    Ongoing     Anxiety     Bipolar affective disorder, current episode manic     Bipolar Disorder, Unspecified     Insomnia     Personality disorder     Tobacco Use Disorder, Continuous    Historical     No qualifying data  Medications    albuterol 90 mcg/inh inhalation aerosol, 2 puff(s), Inhale, q4 hrs, PRN, 3 refills    amoxicillin-clavulanate 875 mg-125 mg oral tablet, 1 tab(s), Oral, q12 hrs    hydrOXYzine hydrochloride 50 mg oral tablet, 50 mg= 1 tab(s), Oral, qid, PRN    spacer for inhaler, See Instructions  Allergies    Tegretol    lamoTRIgine (Eruption)    sulfa drugs  Social History    Smoking Status     Current every day smoker     Alcohol - Current     Electronic Cigarette/Vaping      Electronic Cigarette Use: Never.     Exercise      Exercise type: Running.     Other      Marital Status, Single     Tobacco - Current      5-9 cigarettes (between 1/4 to 1/2 pack)/day in last 30 days, Cigarettes, 13 year(s).  Immunizations          Scheduled Immunizations          Dose  Date(s)          hepatitis B pediatric vaccine          03/20/2001          tetanus/diphth/pertuss (Tdap) adult/adol          04/20/2017          Other Immunizations          Hep B          09/19/2000, 10/24/2000, 03/21/2001          Td          06/23/2004          SARS-CoV-2 (COVID-19) Moderna-1273          04/07/2021, 05/05/2021

## 2022-02-16 NOTE — PROGRESS NOTES
Chief Complaint    Medication refill- citalopram. Verbal consent given for video visit.  History of Present Illness       Kareen calls for a refill of his citalopram and albuterol.  He tells me he has been on citalopram for 15 years.  Says it works well for him.  He uses albuterol perhaps once or twice per week.  He has never used any other medication for his asthma.  He is sleeping well.  He feels like he is doing well.  Review of Systems       Denies fever, chills, cough, dyspnea, wheezing, izzy, suicidal ideation.  Physical Exam       Patient appears comfortable.  Alert and oriented.  Affect normal.  In no distress.  No increased work of breathing.  Assessment/Plan       Anxiety (F41.9)          Refill citalopram.         Ordered:          83179 office outpatient visit 15 minutes (Charge), Quantity: 1, Asthma, allergic  Anxiety                Asthma, allergic (J45.909)          Refill albuterol.         Ordered:          [id number] office outpatient visit 15 minutes (Charge), Quantity: 1, Asthma, allergic  Anxiety                Orders:         albuterol, 2 puff(s), Inhale, q4 hrs, Instructions: use with spacer chamber, PRN: as needed for wheezing, # 1 EA, 2 Refill(s), Type: Maintenance, Pharmacy: [facility] #[id number], 2 puff(s) Inhale q4 hrs,PRN:as needed for wheezing,Instr:use with spacer mica..., (Ordered)         citalopram, = 1 tab(s), Oral, daily, # 30 tab(s), 5 Refill(s), Type: Maintenance, Pharmacy: [facility] #[id number], Med check with PCP before any more refills. Tried to call patient. Number not in service., 1 tab(s) Oral daily, 66, in, 01/27/20 10:48:00 CST,..., (Ordered)  Patient Information     Name:KAREEN LO      Address:      [address] [place] [id number]     Sex:Male     YOB: 1990     [phone]     Emergency Contact:LUCIANA LO     MRN:[id number]     FIN:[id number]     Location:[facility]     Date of Service:11/06/2020       Primary Care Physician:       Brian Jarvis MD, [phone]      Attending Physician:       Iraj Wilder MD, [phone]  Problem List/Past Medical History    Ongoing     Anxiety     Bipolar Disorder, Unspecified     Insomnia     Tobacco Use Disorder, Continuous    Historical     No qualifying data  Medications    albuterol 90 mcg/inh inhalation aerosol, 2 puff(s), Inhale, q4 hrs, PRN, 2 refills    citalopram 40 mg oral tablet, 1 tab(s), Oral, daily, 5 refills    Mucinex 600 mg oral tablet, extended release, 600 mg= 1 tab(s), Oral, q12 hrs    spacer for inhaler, See Instructions  Allergies    Tegretol  Social History    Smoking Status     Current every day smoker     Alcohol - Current     Exercise      Exercise type: Running.     Other      Marital Status, Single     Tobacco - Current      5-9 cigarettes (between 1/4 to 1/2 pack)/day in last 30 days, Cigarettes, 13 year(s).  Immunizations      Vaccine Date Status          tetanus/diphth/pertuss (Tdap) adult/adol 04/20/2017 Recorded          Td 06/23/2004 Recorded          Hep B 03/21/2001 Recorded          Hep B 10/24/2000 Recorded          Hep B 09/19/2000 Recorded  Visit Information  Visit type: Video Visit via [employer] or [employer].  Participants in room during visit:1  Location of patient: home  Location of provider: unit 3 office    (Clinic office or Home office)  Video Start Time: 1400  Video End Time:   1410  Today's visit was conducted via video conference due to the COVID-19 pandemic. The patient's consent to proceed with a video visit has been obtained and documented.

## 2022-02-16 NOTE — PROGRESS NOTES
Chief Complaint    lungs hurt, diarrhea for months, bodyaches, HA, nausea, vomiting, coughing, unsure if any fever, chills, brain fog, numbness of right shoulder and upper arm, says that he has had symptoms for the past two months  History of Present Illness       Patient is here with concerns about post Covid syndrome.  He was exposed to a sibling with test positive COVID-19 between his maternal vaccines.  He has had continued trouble with pleuritic pain, loose stools, body aches, headache, nausea, vomiting, productive cough, chills, brain fog and occasional paresthesias.       His bipolar disorder has stabilized.  He is dealing with this more effectively and has reconciled with his family.          Review of Systems          ROS reviewed and negative except for symptoms noted in HPI.  Physical Exam   Vitals & Measurements    T: 97.5  F (Tympanic)  HR: 115 (Peripheral)  BP: 102/76  SpO2: 98%     HT: 66 in  WT: 157.2 lb  BMI: 25.37           General:  Alert and oriented, No acute distress.            Eye:  Normal conjunctiva.            HENT:  Normocephalic, Tympanic membranes are clear, Oral mucosa is moist, No pharyngeal erythema.                 Throat: Pharynx is clear          Neck:  Supple, Non-tender, No lymphadenopathy.            Respiratory:  Lungs have good air movement with scattered wheezes and rhonchi, respirations are non-labored.            Cardiovascular:  Normal rate, Regular rhythm.            Integumentary:  Warm, Dry.            Psychiatric:  Cooperative, Appropriate mood & affect.   Assessment/Plan       1. 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  He will be treated with azithromycin to cover bacterial superinfection, prednisone, and albuterol MDI.  Follow-up in the next couple weeks if not improving.          Ordered:          azithromycin, 1 tab, PO, Daily, Instructions: Take 2 today then 1 daily for 4 days, # 6 tab(s), 0 Refill(s), Type:  Maintenance, Pharmacy: [facility] #[id number], 1 tab Oral daily,x5 day(s),Instr:Take 2 today then 1 daily for 4 days, 66, in, 06/02/21 10:11:00 C..., (Ordered)          predniSONE, = 1 tab(s) ( 20 mg ), Oral, daily, x 7 day(s), # 7 tab(s), 0 Refill(s), Type: Acute, Pharmacy: [facility] #[id number], 1 tab(s) Oral daily,x7 day(s), 66, in, 06/02/21 10:11:00 CDT, Height Measured, 157.2, lb, 06/02/21 10:11:00 CDT, Weight Measured, (Ordered)                Orders:         albuterol, See Instructions, Instructions: INHALE 2 PUFFS EVERY 4 HOURS AS NEEDED FOR WHEEZING. USE WITH SPACER., # 18 gm, 3 Refill(s), Type: Hard Stop, Pharmacy: [facility] #[id number], 66, in, 01/27/20 10:48:00 CST, Height Measured, 160, lb, 01/27/20 10:4..., (Completed)         albuterol, 2 puff(s), Inhale, q4 hrs, PRN: wheezing, # 1 EA, 3 Refill(s), Type: Maintenance, Pharmacy: [facility] #[id number], 2 puff(s) Inhale q4 hrs,PRN:wheezing, 66, in, 06/02/21 10:11:00 CDT, Height Measured, 157.2, lb, 06/02/21 10:11:00 CDT, Weight Winsome..., (Ordered)         DULoxetine, = 1 cap(s) ( 20 mg ), Oral, daily, # 30 cap(s), 2 Refill(s), Type: Maintenance, Pharmacy: [facility] #[id number], 1 cap(s) Oral daily, 66, in, 03/01/21 10:35:00 CST, Height Measured, 148, lb, 03/01/21 10:35:00 CST, Weight Measured, (Completed)         guaiFENesin, = 1 tab(s) ( 600 mg ), Oral, q12 hrs, # 20 tab(s), 0 Refill(s), Type: Maintenance, Pharmacy: [facility] #[id number], 1 tab(s) Oral q12 hrs, (Completed)         lurasidone, = 1 tab(s) ( 40 mg ), Oral, daily, # 30 tab(s), 2 Refill(s), Type: Maintenance, Pharmacy: [facility] #[id number], 1 tab(s) Oral daily, 66, in, 03/01/21 10:35:00 CST, Height Measured, 148, lb, 03/01/21 10:35:00 CST, Weight Measured, (Completed)  Patient Information     Name:TERESITA TAL MARTINEZ      Address:      [address] [place] [id number]     Sex:Male     YOB: 1990     [phone]      Emergency Contact:LUCIANA LO     MRN:[id number]     FIN:[id number]     Location:[facility]     Date of Service:06/02/2021      Primary Care Physician:       Brian Jarvis MD, [phone]      Attending Physician:       Brian Jarvis MD, [phone]  Problem List/Past Medical History    Ongoing     Anxiety     Bipolar affective disorder, current episode manic     Bipolar Disorder, Unspecified     Insomnia     Personality disorder     Tobacco Use Disorder, Continuous    Historical     No qualifying data  Medications    albuterol 90 mcg/inh inhalation aerosol, 2 puff(s), Inhale, q4 hrs, PRN, 3 refills    azithromycin 250 mg oral tablet, 1 tab, Oral, daily    predniSONE 20 mg oral tablet, 20 mg= 1 tab(s), Oral, daily    spacer for inhaler, See Instructions  Allergies    Tegretol  Social History    Smoking Status     Current every day smoker     Alcohol - Current     Electronic Cigarette/Vaping      Electronic Cigarette Use: Never.     Exercise      Exercise type: Running.     Other      Marital Status, Single     Tobacco - Current      5-9 cigarettes (between 1/4 to 1/2 pack)/day in last 30 days, Cigarettes, 13 year(s).  Immunizations          Scheduled Immunizations          Dose Date(s)          hepatitis B pediatric vaccine          03/20/2001          tetanus/diphth/pertuss (Tdap) adult/adol          04/20/2017          Other Immunizations          Hep B          09/19/2000, 10/24/2000, 03/21/2001          Td          06/23/2004          SARS-CoV-2 (COVID-19) Moderna-1273          04/07/2021, 05/05/2021

## 2022-02-16 NOTE — NURSING NOTE
Order for CT Head w/o Contrast faxed to [facility] and they will contact pt's mother, Marie, at [phone]

## 2022-02-16 NOTE — NURSING NOTE
Depression Screening Entered On:  9/5/2019 9:23 AM CDT    Performed On:  9/3/2019 9:23 AM CDT by Radha Mchugh               Depression Screening   Little Interest - Pleasure in Activities :   Several days   Feeling Down, Depressed, Hopeless :   Several days   Initial Depression Screen Score :   2    Trouble Falling or Staying Asleep :   Nearly every day   Feeling Tired or Little Energy :   More than half the days   Poor Appetite or Overeating :   More than half the days   Feeling Bad About Yourself :   Several days   Trouble Concentrating :   Nearly every day   Moving or Speaking Slowly :   Not at all   Thoughts Better Off Dead or Hurting Self :   Several days   Detailed Depression Screen Score :   12    Total Depression Screen Score :   14    MIMI Difficulty with Work, Home, Others :   Very difficult   Radha Mchugh - 9/5/2019 9:23 AM CDT

## 2022-02-16 NOTE — NURSING NOTE
Comprehensive Intake Entered On:  12/6/2021 10:37 AM CST    Performed On:  12/6/2021 10:33 AM CST by Mae Vaughn LPN               Summary   Chief Complaint :   cough, congestion started last week. was treated for sinus infection about a month ago. verbal consent given for video.    Mae Vaughn LPN - 12/6/2021 10:33 AM CST   Health Status   Allergies Verified? :   Yes   Medication History Verified? :   Yes   Pre-Visit Planning Status :   Completed   Mae Vaughn LPN - 12/6/2021 10:33 AM CST   Consents   Consent for Immunization Exchange :   Consent Granted   Consent for Immunizations to Providers :   Consent Granted   Mae Vaughn LPN - 12/6/2021 10:33 AM CST   Meds / Allergies   (As Of: 12/6/2021 10:37:54 AM CST)   Allergies (Active)   Tegretol  Estimated Onset Date:   Unspecified ; Created By:   April Meyer; Reaction Status:   Active ; Category:   Drug ; Substance:   Tegretol ; Type:   Allergy ; Updated By:   April Meyer; Reviewed Date:   3/1/2021 10:41 AM CST        Medication List   (As Of: 12/6/2021 10:37:54 AM CST)   Prescription/Discharge Order    albuterol  :   albuterol ; Status:   Prescribed ; Ordered As Mnemonic:   albuterol 90 mcg/inh inhalation aerosol ; Simple Display Line:   2 puff(s), Inhale, q4 hrs, PRN: wheezing, 1 EA, 3 Refill(s) ; Ordering Provider:   Brian Jarvis MD; Catalog Code:   albuterol ; Order Dt/Tm:   6/2/2021 10:41:01 AM CDT          azithromycin  :   azithromycin ; Status:   Completed ; Ordered As Mnemonic:   azithromycin 250 mg oral tablet ; Simple Display Line:   1 tab, PO, Daily, for 5 day(s), Take 2 today then 1 daily for 4 days, 6 tab(s), 0 Refill(s) ; Ordering Provider:   Rico Dalton PA-C; Catalog Code:   azithromycin ; Order Dt/Tm:   6/2/2021 10:41:48 AM CDT          Miscellaneous Rx Supply  :   Miscellaneous Rx Supply ; Status:   Prescribed ; Ordered As Mnemonic:   spacer for inhaler ; Simple Display Line:   See Instructions, use  with albuterol inhaler, 1 EA, 0 Refill(s) ; Ordering Provider:   Troy Crisostomo PA-C; Catalog Code:   Miscellaneous Rx Supply ; Order Dt/Tm:   1/27/2020 11:02:06 AM CST

## 2022-02-16 NOTE — TELEPHONE ENCOUNTER
---------------------  From: Mai Medina   To: Matheus FIERRO, Brian;     Sent: 10/29/2021 1:07:13 PM CDT  Subject: Scheduling Management     Patient cancelled appointment for 11.4.21. Appt was for weight loss, not eating and labored breathing (possible black mold exposure). Patient was seen in ER.  
58

## 2022-02-16 NOTE — TELEPHONE ENCOUNTER
---------------------  From: Abi Muñoz RN (Phone Messages Pool ([id number]_[facility]))   Sent: 12/20/2021 11:08:39 AM CST  Subject: CT results       PCP:  REMI      Time of Call:  10:12am       Person Calling:  pt  Phone number:  [phone]    Returned call at: 11am    Note:   Vm received from pt, 1- requesting results of CT scan that was done last week. 2- stated he forgot to take the antibiotics for 2 days, should he finish/resume taking them?    TC attempted x2 with the number given; VM is not setup.   Results letter was sent on 12/16/21. No antibiotic orders in chart    Last office visit and reason:  12/14/21 disorder/head injury GTG

## 2022-02-16 NOTE — NURSING NOTE
Comprehensive Intake Entered On:  11/6/2020 1:55 PM CST    Performed On:  11/6/2020 1:53 PM CST by Lora Barone               Summary   Chief Complaint :   Medication refill- citalopram. Verbal consent given for video visit.     Lora Barone - 11/6/2020 1:53 PM CST   Health Status   Allergies Verified? :   Yes   Medication History Verified? :   Yes   Medical History Verified? :   Yes   Pre-Visit Planning Status :   Completed   Tobacco Use? :   Current every day smoker   Lora Barone - 11/6/2020 1:53 PM CST   Consents   Consent for Immunization Exchange :   Consent Granted   Consent for Immunizations to Providers :   Consent Granted   Lora Barone - 11/6/2020 1:53 PM CST   Meds / Allergies   (As Of: 11/6/2020 1:55:41 PM CST)   Allergies (Active)   Tegretol  Estimated Onset Date:   Unspecified ; Created By:   April Meyer; Reaction Status:   Active ; Category:   Drug ; Substance:   Tegretol ; Type:   Allergy ; Updated By:   April Meyer; Reviewed Date:   11/6/2020 1:54 PM CST        Medication List   (As Of: 11/6/2020 1:55:41 PM CST)   Prescription/Discharge Order    albuterol  :   albuterol ; Status:   Prescribed ; Ordered As Mnemonic:   albuterol 90 mcg/inh inhalation aerosol ; Simple Display Line:   180 mcg, 2 puff(s), Inhale, q6 hrs, use with spacer chamber, PRN: as needed for wheezing, 1 EA, 2 Refill(s) ; Ordering Provider:   Troy Crisostomo PA-C; Catalog Code:   albuterol ; Order Dt/Tm:   1/27/2020 11:01:18 AM CST          azithromycin  :   azithromycin ; Status:   Processing ; Ordered As Mnemonic:   Zithromax Z-Anshul 250 mg oral tablet ; Ordering Provider:   Troy Crisostomo PA-C; Action Display:   Complete ; Catalog Code:   azithromycin ; Order Dt/Tm:   11/6/2020 1:54:40 PM CST          citalopram  :   citalopram ; Status:   Prescribed ; Ordered As Mnemonic:   citalopram 40 mg oral tablet ; Simple Display Line:   1 tab(s), Oral, daily, 30 tab(s), 0 Refill(s) ; Ordering Provider:    Brian Jarvis MD; Catalog Code:   citalopram ; Order Dt/Tm:   9/29/2020 11:22:15 AM CDT          guaiFENesin  :   guaiFENesin ; Status:   Prescribed ; Ordered As Mnemonic:   Mucinex 600 mg oral tablet, extended release ; Simple Display Line:   600 mg, 1 tab(s), Oral, q12 hrs, 20 tab(s), 0 Refill(s) ; Ordering Provider:   Troy Crisostomo PA-C; Catalog Code:   guaiFENesin ; Order Dt/Tm:   1/27/2020 11:03:28 AM CST          Miscellaneous Rx Supply  :   Miscellaneous Rx Supply ; Status:   Prescribed ; Ordered As Mnemonic:   spacer for inhaler ; Simple Display Line:   See Instructions, use with albuterol inhaler, 1 EA, 0 Refill(s) ; Ordering Provider:   Troy Crisostomo PA-C; Catalog Code:   Miscellaneous Rx Supply ; Order Dt/Tm:   1/27/2020 11:02:06 AM CST            ID Risk Screen   Recent Travel History :   No recent travel   Family Member Travel History :   No recent travel   Other Exposure to Infectious Disease :   Unknown   Lora Barone - 11/6/2020 1:53 PM CST

## 2022-02-16 NOTE — NURSING NOTE
Generalized Anxiety Disorder Screening Entered On:  5/11/2019 3:24 PM CDT    Performed On:  5/6/2019 3:24 PM CDT by Miriam April               Generalized Anxiety Disorder Screening   MIMI Nervous, Anxious On Edge :   Nearly every day   MIMI Control Worrying B :   Nearly every day   MIMI Worrying Too Much :   More than half the days   MIMI Restless :   More than half the days   MIMI Easily Annoyed/Irritable :   More than half the days   MIMI Afraid :   Several days   MIMI Trouble Relaxing :   More than half the days   MIMI Total Screening Score :   15    Miriam April - 5/11/2019 3:24 PM CDT

## 2022-02-16 NOTE — TELEPHONE ENCOUNTER
---------------------  From: Nicole Abebe LPN (eRx Pool ([id number]_[facility]))   To: [facility] Message Pool ([id number]_[place] [facility]);     Sent: 4/13/2020 11:58:13 AM CDT  Subject: FW: Medication Management   Due Date/Time: 4/11/2020 7:24:00 PM CDT     last seem 1- for jordana    last med check 9-2019   6 month follow up- no RTC placed Due now     please advise       ------------------------------------------  From: [facility] #[id number]  To: Brian Jarvis MD  Sent: April 10, 2020 7:24:08 PM CDT  Subject: Medication Management  Due: April 11, 2020 7:24:08 PM CDT    ** On Hold Pending Signature **  Drug: citalopram (citalopram 40 mg oral tablet)  TAKE 1 TABLET BY MOUTH DAILY  Quantity: 90 tab(s)  Days Supply: 90  Refills: 0  Substitutions Allowed  Notes from Pharmacy:     Dispensed Drug: citalopram (citalopram 40 mg oral tablet)  TAKE 1 TABLET BY MOUTH DAILY  Quantity: 90 tab(s)  Days Supply: 90  Refills: 0  Substitutions Allowed  Notes from Pharmacy:   ---------------------------------------------------------------  From: Lucretia Smith MA   To: [facility] #[id number]    Sent: 4/13/2020 3:16:26 PM CDT  Subject: FW: Medication Management     ** Submitted: **  Order:citalopram (citalopram 40 mg oral tablet)  1 tab(s)  Oral  daily  Qty:  30 tab(s)        Refills:  0          Substitutions Allowed     Route To Pharmacy - [facility] #[id number]    Signed by Lucretia Smith MA  4/13/2020 8:15:00 PM    ** Submitted: **  Complete:citalopram (citalopram 40 mg oral tablet)   Signed by Lucretia Smith MA  4/13/2020 8:16:00 PM    ** Not Approved:  **  citalopram (CITALOPRAM 40MG TABLETS)  TAKE 1 TABLET BY MOUTH DAILY  Qty:  90 tab(s)        Days Supply:  90        Refills:  0          Substitutions Allowed     Route To Pharmacy - [facility] #[id number]   Signed by Lucretia Smith MA

## 2022-02-16 NOTE — TELEPHONE ENCOUNTER
---------------------  From: Shabnam Magana LPN (Phone Messages Pool ([id number]))   To: [employer] Message Pool ([id number]_[place] - [place]);     Sent: 12/21/2021 10:38:48 AM CST  Subject: results/antibiotics     Phone Message    PCP:   REMI      Time of Call:  9:38am       Person Calling:  pt  Phone number:  [phone]    Returned call at: 10:20am    Note:   Pt LM stating he had LM yesterday regarding results and antibiotics. (message attached).    Called pt and informed him of result letter sent 12/16- normal CT result.  Pt says he had been on antibiotics and then forgot to take them for a few days. Pt says he had restarted and just finished them.  Pt says he is no longer in appt- he thinks it may have been black mold because his congestion is clearing now that he is staying with a friend. Pt says he is blowing bright red blood clots from his nose and is still having pain in his sinuses.    Pt says when he was little there was a problem at school with black mold and he had similar symptoms.    Pt wanting to make sure that he is still ok/ no other antibiotics are needed.    Last office visit and reason:  12/14/21 Disorder, history of head injury---------------------  From: Mae Vaughn LPN ([employer] Message Pool ([id number]_[facility]))   To: Brian Jarvis MD;     Sent: 12/21/2021 11:09:59 AM CST  Subject: FW: results/antibiotics---------------------  From: Brian Jarvis MD   To: [employer] Pool ([id number]_[place]);     Sent: 12/21/2021 3:09:31 PM CST  Subject: RE: results/antibiotics     He does not need further treatment.notified patient

## 2022-02-16 NOTE — TELEPHONE ENCOUNTER
---------------------  From: Brian Jarvis MD   To: [employer] Pool ([id number]_[place] - [place]);     Sent: 6/29/2021 6:25:15 PM CDT  Subject: General Message     Please check with patient to see if he has resumed his medications.  He was not taking lurasidone and duloxetine at his last visitContacted pt, he is no longer taking Latuda or duloxetine.  Refill requests have been cancelled/denied to pharmacy via [facility].

## 2022-02-16 NOTE — NURSING NOTE
Comprehensive Intake Entered On:  1/27/2020 10:53 AM CST    Performed On:  1/27/2020 10:48 AM CST by Ramon Espitia CMA               Summary   Chief Complaint :   Pt here for productive cough x week.   Weight Measured :   160 lb(Converted to: 160 lb 0 oz, 72.57 kg)    Height Measured :   66 in(Converted to: 5 ft 6 in, 167.64 cm)    Body Mass Index :   25.82 kg/m2 (HI)    Body Surface Area :   1.84 m2   Systolic Blood Pressure :   102 mmHg   Diastolic Blood Pressure :   70 mmHg   Mean Arterial Pressure :   81 mmHg   Peripheral Pulse Rate :   100 bpm   BP Site :   Right arm   Pulse Site :   Radial artery   Temperature Tympanic :   97.8 DegF(Converted to: 36.6 DegC)  (LOW)    Respiratory Rate :   16 br/min   Oxygen Saturation :   98 %   Ramon Espitia CMA - 1/27/2020 10:48 AM CST   Health Status   Allergies Verified? :   Yes   Medication History Verified? :   Yes   Medical History Verified? :   Yes   Pre-Visit Planning Status :   Not completed   Tobacco Use? :   Current every day smoker   Tobacco Cessation Review :   Not ready to quit   Ramon Espitia CMA - 1/27/2020 10:48 AM CST   Meds / Allergies   (As Of: 1/27/2020 10:53:49 AM CST)   Allergies (Active)   Tegretol  Estimated Onset Date:   Unspecified ; Created By:   April Cornejo; Reaction Status:   Active ; Category:   Drug ; Substance:   Tegretol ; Type:   Allergy ; Updated By:   April Cornejo; Reviewed Date:   1/27/2020 10:51 AM CST        Medication List   (As Of: 1/27/2020 10:53:49 AM CST)   Prescription/Discharge Order    citalopram  :   citalopram ; Status:   Prescribed ; Ordered As Mnemonic:   citalopram 40 mg oral tablet ; Simple Display Line:   40 mg, 1 tab(s), PO, Daily, 90 tab(s), 1 Refill(s) ; Ordering Provider:   Brian Jarvis MD; Catalog Code:   citalopram ; Order Dt/Tm:   9/3/2019 4:08:34 PM CDT            Home Meds    albuterol  :   albuterol ; Status:   Documented ; Ordered As Mnemonic:   albuterol 90 mcg/inh inhalation aerosol ; Simple  Display Line:   2 puff(s), Inhale, q6 hrs, 0 Refill(s) ; Catalog Code:   albuterol ; Order Dt/Tm:   1/27/2020 10:51:27 AM CST            Social History   Social History   (As Of: 1/27/2020 10:53:49 AM CST)   Alcohol:  Current      (Last Updated: 2/1/2011 10:07:37 AM CST by April Cornejo )         Tobacco:  Current      5-9 cigarettes (between 1/4 to 1/2 pack)/day in last 30 days, Cigarettes, 13 year(s).   (Last Updated: 1/27/2020 10:53:46 AM CST by Ramon Espitia CMA)          Exercise:        Exercise type: Running.   (Last Updated: 2/1/2011 10:07:48 AM CST by April Cornejo)          Other:        Marital Status, Single   (Last Updated: 2/1/2011 10:08:09 AM CST by April Cornejo)

## 2022-02-16 NOTE — TELEPHONE ENCOUNTER
---------------------  From: Priti Macias RN ([employer] Messages Pool ([address] [place] [address]))   To: [facility] Message Pool ([address] [place] [address]);     Sent: 3/22/2021 11:38:58 AM CDT  Subject: Rx RF Duloxetine and Latuda     Time of Call:  1100     Person Calling:  patient  Phone number:  mom : Marie   Patient: [phone]    Note:   Requests a refill of the new medication from his recent hospitalization. Duloxetine 20mg daily and Latuda 40 mg at HS. Please send to [facility]. He has gotten his referral but it took longer than he thought.  These medications are documented only meds at this time.    Last office visit and reason:  3/1/2021 f/u hospitalization---------------------  From: Lucretia Smith MA ([email or web address] Pool ([address] [place] [address]))   To: Brian Jarvis MD;     Sent: 3/22/2021 12:20:59 PM CDT  Subject: FW: Rx RF Duloxetine and Latuda---------------------  From: Brian Jarvis MD   To: [facility] Message Pool ([id number]);     Sent: 3/22/2021 2:29:41 PM CDT  Subject: RE: Rx RF Duloxetine and Latuda     OK to refill for 30 days refill x2  ** Submitted: **  Order:lurasidone (Latuda 40 mg oral tablet)  1 tab(s)  Oral  daily  Qty:  30 tab(s)        Refills:  2          Substitutions Allowed     Route To [facility] #[id number]    Signed by Lucretia Smith MA  3/22/2021 7:41:00 PM UT    ** Submitted: **  Order:DULoxetine (DULoxetine 20 mg oral delayed release capsule)  1 cap(s)  Oral  daily  Qty:  30 cap(s)        Refills:  2          Substitutions Allowed     Route To [facility] #[id number]    Signed by Lucretia Smith MA  3/22/2021 7:41:00 PM UTC    ** Documented **  Discontinue:DULoxetine (DULoxetine 20 mg oral delayed release capsule)   Signed by Sarah MANUEL Lucretia  3/22/2021 7:41:00 PM UTC    ** Documented **  Discontinue:lurasidone (Latuda 40 mg oral tablet)   Signed by Lucretia Smith MA  3/22/2021 7:41:00 PM UTCLM for pt re: rx refills sent in  per [facility].

## 2022-02-16 NOTE — NURSING NOTE
Depression Screening Entered On:  3/1/2021 11:14 AM CST    Performed On:  3/1/2021 11:14 AM CST by Mae Vaughn LPN               Depression Screening   Little Interest - Pleasure in Activities :   Nearly every day   Feeling Down, Depressed, Hopeless :   Nearly every day   Initial Depression Screen Score :   6 Score   Poor Appetite or Overeating :   Nearly every day   Trouble Falling or Staying Asleep :   Nearly every day   Feeling Tired or Little Energy :   Nearly every day   Feeling Bad About Yourself :   Nearly every day   Trouble Concentrating :   Nearly every day   Moving or Speaking Slowly :   Nearly every day   Thoughts Better Off Dead or Hurting Self :   Nearly every day   Difficulty at Work, Home, Getting Along :   Extremely difficult   Detailed Depression Screen Score :   21    Total Depression Screen Score :   27    Mae Vaughn LPN - 3/1/2021 11:14 AM CST

## 2022-02-16 NOTE — NURSING NOTE
Depression Screening Entered On:  5/11/2019 2:27 PM CDT    Performed On:  5/6/2019 2:26 PM CDT by Miriam April               Depression Screening   Little Interest - Pleasure in Activities :   More than half the days   Feeling Down, Depressed, Hopeless :   More than half the days   Initial Depression Screen Score :   4    Trouble Falling or Staying Asleep :   More than half the days   Feeling Tired or Little Energy :   More than half the days   Poor Appetite or Overeating :   More than half the days   Feeling Bad About Yourself :   Several days   Trouble Concentrating :   More than half the days   Moving or Speaking Slowly :   Not at all   Thoughts Better Off Dead or Hurting Self :   More than half the days   Detailed Depression Screen Score :   11    Total Depression Screen Score :   15    MIMI Difficulty with Work, Home, Others :   Very difficult   Miriam , April - 5/11/2019 2:26 PM CDT

## 2022-02-16 NOTE — TELEPHONE ENCOUNTER
"---------------------  From: Lucretia Smith MA ([employer] Pool ([id number]_[facility]))   To: Brian Jarvis MD;     Sent: 6/29/2021 11:51:10 AM CDT  Subject: FW: Medication Management   Due Date/Time: 6/30/2021 3:49:00 AM CDT     Please advise re: further refills.  LV:  6/2/2021 w/ [facility] for post COVID sx, bipolar f/u.  Both meds \"completed\" off med list.      ------------------------------------------  From: [facility] #[id number]  To: Brian Jarvis MD  Sent: June 29, 2021 3:49:49 AM CDT  Subject: Medication Management  Due: June 4, 2021 8:25:53 PM CDT     ** On Hold Pending Signature **     Dispensed Drug: lurasidone (Latuda 40 mg oral tablet), TAKE 1 TABLET BY MOUTH DAILY  Quantity: 30 tab(s)  Days Supply: 30  Refills: 0  Substitutions Allowed  Notes from Pharmacy:     ** On Hold Pending Signature **     Dispensed Drug: DULoxetine (DULoxetine 20 mg oral delayed release capsule), TAKE 1 CAPSULE BY MOUTH DAILY  Quantity: 30 cap(s)  Days Supply: 30  Refills: 0  Substitutions Allowed  Notes from Pharmacy:  ---------------------------------------------------------------  From: Lucretia Smith MA   To: [facility] #[id number]    Sent: 6/30/2021 1:20:10 PM CDT  Subject: FW: Medication Management     ** Not Approved: Refill not appropriate, Per pt, he is no longer taking meds **  lurasidone (LATUDA 40MG TABLETS)  TAKE 1 TABLET BY MOUTH DAILY  Qty:  30 tab(s)        Days Supply:  30        Refills:  0          Substitutions Allowed     Route To Pharmacy - [facility] #[id number]   Signed by Lucretia Smith MA    ** Not Approved: Refill not appropriate, Per pt, he is no longer taking meds **  DULoxetine (DULOXETINE DR 20MG CAPSULES)  TAKE 1 CAPSULE BY MOUTH DAILY  Qty:  30 cap(s)        Days Supply:  30        Refills:  0          Substitutions Allowed     [id number]   Signed by Lucretia Smith MA  "

## 2022-02-16 NOTE — LETTER
(Inserted Image. Unable to display)   May 10, 2021    TAL LO  [address] [place] [address]            Dear TAL,      Thank you for selecting [facility] for your healthcare needs.    Our records indicate you are due for the following services:     Follow-up office visit / Medication Check.    (FYI   Regarding office visits: In some instances, a video visit or telephone visit may be offered as an option.)      To schedule an appointment or if you have further questions, please contact your clinic at [phone].      Powered by [employer] and Note    Sincerely,    Iraj Wilder MD, FACP

## 2022-02-16 NOTE — NURSING NOTE
Comprehensive Intake Entered On:  12/14/2021 11:29 AM CST    Performed On:  12/14/2021 11:24 AM CST by Lucretia Smith MA               Summary   Chief Complaint :   c/o having panic attacks since having head injury 11/25/2021, waking up w/ cold sweats, has been seen in ER over the weekend.  also has long term mold exposure.   Weight Measured :   145.2 lb(Converted to: 145 lb 3 oz, 65.862 kg)    Height Measured :   66 in(Converted to: 5 ft 6 in, 167.64 cm)    Body Mass Index :   23.43 kg/m2   Body Surface Area :   1.75 m2   Systolic Blood Pressure :   143 mmHg (HI)    Diastolic Blood Pressure :   96 mmHg (HI)    Mean Arterial Pressure :   112 mmHg   Peripheral Pulse Rate :   144 bpm (HI)    BP Site :   Right arm   Pulse Site :   Brachial artery   BP Method :   Electronic   HR Method :   Electronic   Temperature Tympanic :   98.9 DegF(Converted to: 37.2 DegC)    Lucretia Smith MA - 12/14/2021 11:24 AM CST   Health Status   Allergies Verified? :   Yes   Medication History Verified? :   Yes   Medical History Verified? :   Yes   Pre-Visit Planning Status :   Completed   Tobacco Use? :   Current every day smoker   Lucretia Smith MA - 12/14/2021 11:24 AM CST   Consents   Consent for Immunization Exchange :   Consent Granted   Consent for Immunizations to Providers :   Consent Granted   Lucretia Smith MA - 12/14/2021 11:24 AM CST   Meds / Allergies   (As Of: 12/14/2021 11:29:50 AM CST)   Allergies (Active)   lamoTRIgine  Estimated Onset Date:   <not entered> 1/13/2017 ; Reactions:   Eruption ; Created By:   Lucretia Smith MA; Reaction Status:   Active ; Category:   Drug ; Substance:   lamoTRIgine ; Type:   Allergy ; Updated By:   Lucretia Smith MA; Reviewed Date:   12/13/2021 4:25 PM CST      sulfa drugs  Estimated Onset Date:   Unspecified ; Created By:   Lucretia Smith MA; Reaction Status:   Active ; Category:   Drug ; Substance:   sulfa drugs ; Type:   Allergy ; Updated By:   Lucretia Smith MA; Source:   Other ;  Reviewed Date:   12/13/2021 4:26 PM CST      Tegretol  Estimated Onset Date:   Unspecified ; Created By:   April Meyer; Reaction Status:   Active ; Category:   Drug ; Substance:   Tegretol ; Type:   Allergy ; Updated By:   April Meyer; Reviewed Date:   3/1/2021 10:41 AM CST        Medication List   (As Of: 12/14/2021 11:29:50 AM CST)   Prescription/Discharge Order    albuterol  :   albuterol ; Status:   Prescribed ; Ordered As Mnemonic:   albuterol 90 mcg/inh inhalation aerosol ; Simple Display Line:   2 puff(s), Inhale, q4 hrs, PRN: wheezing, 1 EA, 3 Refill(s) ; Ordering Provider:   Brian Jarvis MD; Catalog Code:   albuterol ; Order Dt/Tm:   6/2/2021 10:41:01 AM CDT          amoxicillin-clavulanate  :   amoxicillin-clavulanate ; Status:   Prescribed ; Ordered As Mnemonic:   amoxicillin-clavulanate 875 mg-125 mg oral tablet ; Simple Display Line:   1 tab(s), Oral, q12 hrs, for 10 day(s), 20 tab(s), 0 Refill(s) ; Ordering Provider:   Rico Dalton PA-C; Catalog Code:   amoxicillin-clavulanate ; Order Dt/Tm:   12/6/2021 10:54:25 AM CST          Miscellaneous Rx Supply  :   Miscellaneous Rx Supply ; Status:   Prescribed ; Ordered As Mnemonic:   spacer for inhaler ; Simple Display Line:   See Instructions, use with albuterol inhaler, 1 EA, 0 Refill(s) ; Ordering Provider:   Troy Crisostomo PA-C; Catalog Code:   Miscellaneous Rx Supply ; Order Dt/Tm:   1/27/2020 11:02:06 AM CST            Social History   Social History   (As Of: 12/14/2021 11:29:50 AM CST)   Alcohol:  Current      (Last Updated: 2/1/2011 10:07:37 AM CST by April Cornejo )         Tobacco:  Current      5-9 cigarettes (between 1/4 to 1/2 pack)/day in last 30 days, Cigarettes, 13 year(s).   (Last Updated: 3/1/2021 10:36:14 AM CST by Mae Vaughn LPN)          Electronic Cigarette/Vaping:        Electronic Cigarette Use: Never.   (Last Updated: 3/1/2021 10:39:47 AM CST by Mae Vaughn LPN)          Exercise:        Exercise  type: Running.   (Last Updated: 2/1/2011 10:07:48 AM CST by April Cornejo)          Other:        Marital Status, Single   (Last Updated: 2/1/2011 10:08:09 AM CST by pAril Cornejo)

## 2022-02-16 NOTE — PROGRESS NOTES
Chief Complaint    refills for citalopram. would like ears checked, feel plugged.  History of Present Illness      Patient is here for follow-up on his multiple disorder and anxiety disorder.  He has been on citalopram 40 mg daily with improvement in symptoms.  He has declined other medications.  He also has plugged sensation is both his ears  Review of Systems      See HPI.  All other review of systems negative.  Physical Exam   Vitals & Measurements    T: 98.7   F (Tympanic)  HR: 76(Peripheral)  BP: 120/78     HT: 66 in  WT: 166 lb  BMI: 26.79       Alert, oriented, no acute distress       Ear canals patent, TMs clear       Throat is clear       Heart is regular rate and rhythm        Nonlabored breathing        Cooperative, mildly depressed affect  Assessment/Plan       Anxiety (F41.9)         Continue with citalopram and follow-up in 6 months        PHQ-9, MIMI-7 reviewed       Bipolar Disorder, Unspecified (F31.9)         Citalopram follow-up in 6 months       Orders:         citalopram, = 1 tab(s) ( 40 mg ), PO, Daily, # 90 tab(s), 1 Refill(s), Type: Maintenance, Pharmacy: [facility] #[id number], 1 tab(s) Oral daily, (Ordered)         citalopram, = 1 tab(s) ( 40 mg ), PO, Daily, # 30 tab(s), 2 Refill(s), Type: Hard Stop, Pharmacy: [id number], (Completed)  Patient Information     Name:TAL LO      Address:      [address] [place] [address]     Sex:Male     YOB: 1990     [phone]     Emergency Contact:[facility] EMERGENCY, CONTACT     MRN:[id number]     FIN:[id number]     Location:[facility]     Date of Service:09/03/2019      Primary Care Physician:       Brian Jarvis MD, [phone]      Attending Physician:       Brian Jarvis MD, [phone]  Problem List/Past Medical History    Ongoing     Anxiety     Bipolar Disorder, Unspecified     Insomnia     Tobacco Use Disorder, Continuous    Historical     No  qualifying data  Medications    citalopram 40 mg oral tablet, 40 mg= 1 tab(s), Oral, daily, 1 refills  Allergies    Tegretol  Social History    Smoking Status - 02/23/2011     Yes     Alcohol - Current, 02/01/2011     Exercise      Exercise type: Running., 02/01/2011     Other      Marital Status, Single, 02/01/2011     Tobacco - Current, 02/01/2011  Immunizations      Vaccine Date Status      tetanus/diphth/pertuss (Tdap) adult/adol 04/20/2017 Recorded      Td 06/23/2004 Recorded      Hep B 03/21/2001 Recorded      Hep B 10/24/2000 Recorded      Hep B 09/19/2000 Recorded

## 2022-08-08 ENCOUNTER — OFFICE VISIT (OUTPATIENT)
Dept: FAMILY MEDICINE | Facility: CLINIC | Age: 32
End: 2022-08-08
Payer: MEDICAID

## 2022-08-08 VITALS
OXYGEN SATURATION: 98 % | DIASTOLIC BLOOD PRESSURE: 89 MMHG | HEART RATE: 117 BPM | SYSTOLIC BLOOD PRESSURE: 150 MMHG | TEMPERATURE: 98.3 F

## 2022-08-08 DIAGNOSIS — M89.8X9 BONE PAIN: ICD-10-CM

## 2022-08-08 DIAGNOSIS — Z77.120 EXPOSURE TO MOLD: ICD-10-CM

## 2022-08-08 DIAGNOSIS — J34.89 FRONTAL SINUS PAIN: ICD-10-CM

## 2022-08-08 DIAGNOSIS — R06.02 SHORTNESS OF BREATH: ICD-10-CM

## 2022-08-08 DIAGNOSIS — J01.90 ACUTE SINUSITIS WITH SYMPTOMS > 10 DAYS: Primary | ICD-10-CM

## 2022-08-08 LAB
BASOPHILS # BLD AUTO: 0 10E3/UL (ref 0–0.2)
BASOPHILS NFR BLD AUTO: 0 %
EOSINOPHIL # BLD AUTO: 0.1 10E3/UL (ref 0–0.7)
EOSINOPHIL NFR BLD AUTO: 1 %
ERYTHROCYTE [DISTWIDTH] IN BLOOD BY AUTOMATED COUNT: 12.4 % (ref 10–15)
HCT VFR BLD AUTO: 49.5 % (ref 40–53)
HGB BLD-MCNC: 17.1 G/DL (ref 13.3–17.7)
IMM GRANULOCYTES # BLD: 0 10E3/UL
IMM GRANULOCYTES NFR BLD: 0 %
LYMPHOCYTES # BLD AUTO: 1.8 10E3/UL (ref 0.8–5.3)
LYMPHOCYTES NFR BLD AUTO: 18 %
MCH RBC QN AUTO: 30.5 PG (ref 26.5–33)
MCHC RBC AUTO-ENTMCNC: 34.5 G/DL (ref 31.5–36.5)
MCV RBC AUTO: 88 FL (ref 78–100)
MONOCYTES # BLD AUTO: 0.6 10E3/UL (ref 0–1.3)
MONOCYTES NFR BLD AUTO: 6 %
NEUTROPHILS # BLD AUTO: 7.2 10E3/UL (ref 1.6–8.3)
NEUTROPHILS NFR BLD AUTO: 74 %
PLATELET # BLD AUTO: 375 10E3/UL (ref 150–450)
RBC # BLD AUTO: 5.6 10E6/UL (ref 4.4–5.9)
WBC # BLD AUTO: 9.8 10E3/UL (ref 4–11)

## 2022-08-08 PROCEDURE — 85025 COMPLETE CBC W/AUTO DIFF WBC: CPT | Mod: QW | Performed by: FAMILY MEDICINE

## 2022-08-08 PROCEDURE — 99213 OFFICE O/P EST LOW 20 MIN: CPT | Performed by: FAMILY MEDICINE

## 2022-08-08 PROCEDURE — 36415 COLL VENOUS BLD VENIPUNCTURE: CPT | Performed by: FAMILY MEDICINE

## 2022-08-08 RX ORDER — FLUTICASONE PROPIONATE 50 MCG
1 SPRAY, SUSPENSION (ML) NASAL DAILY
Qty: 16 G | Refills: 0 | Status: SHIPPED | OUTPATIENT
Start: 2022-08-08 | End: 2022-10-06

## 2022-08-08 NOTE — PROGRESS NOTES
Clinical Decision Making:    At the end of the encounter, I discussed results, diagnosis, medications. Discussed red flags for immediate return to clinic/ER, as well as indications for follow up if no improvement. Patient understood and agreed to plan. Patient was stable for discharge.      ICD-10-CM    1. Acute sinusitis with symptoms > 10 days  J01.90 amoxicillin-clavulanate (AUGMENTIN) 875-125 MG tablet     fluticasone (FLONASE) 50 MCG/ACT nasal spray   2. Frontal sinus pain  J34.89 Adult ENT  Referral   3. Shortness of breath  R06.02 XR Chest 2 Views   4. Exposure to mold  Z77.120 Adult ENT  Referral   5. Bone pain  M89.8X9 CBC with platelets and differential     CBC with platelets and differential     Eye doctor visit as soon as possible    Take antibiotics as directed -prescribed Augmentin twice daily for 10 days    Referral done for ENT      There are no Patient Instructions on file for this visit.   No follow-ups on file.      chief complaint    HPI:  Kareen Jones is a 31 year old male who presents today complaining of many symptoms over the last several months.  He has had a mold exposure at his apartment from a hot water heater.  He lives with his girlfriend and she has had multiple symptoms as well.  He believes he has had mold and rest exposure.  He is getting chronic sinus congestion and drainage.  He blows out mucus and blood and has been going on for about a year.  He recently has had an increase in pain of his face and he points to the bilateral frontal and maxillary sinuses and also states there is pain around his eyes.  He complains that he is losing vision and focus in his right eye.  He had LASIK done 3 years ago.  He reports an achy chest and lungs.  He has back pain.  He feels he is losing strength in his right arm and that he occasionally has numbness in the right hand.  He reports a soft spot in pain at the back of his head and at his anterior pelvis.  He generally feels  a little short of breath and out of shape as compared to his normal.  They do now have a new hot water heater.    History obtained from the patient.    Problem List:  There are no relevant problems documented for this patient.      No past medical history on file.    Social History     Tobacco Use     Smoking status: Current Every Day Smoker     Types: Cigarettes     Smokeless tobacco: Never Used   Substance Use Topics     Alcohol use: Not on file       Review of systems  negative except listed in HPI    Vitals:    08/08/22 1224   BP: (!) 150/89   BP Location: Right arm   Pulse: 117   Temp: 98.3  F (36.8  C)   SpO2: 98%       Physical Exam  Vitals noted and within normal limits except for elevated blood pressure today  Vitals noted and within normal limits.  Patient is alert, oriented, and in no acute distress.  Eyes: Conjunctive not injected.  Ears: Canals patent, TMs intact, no erythema and no bulging.  Mouth: Mucous membranes pink and moist.  Pharynx is not erythematous.  Neck supple with no cervical lymphadenopathy.  Heart has a regular rate and rhythm with no murmurs.  Lungs are clear to auscultation bilaterally with good air entry.  No wheezes, rales, rhonchi.  Bilateral hands with normal sensation today  Reviewed chest x-ray and CBC results with the patient  Results for orders placed or performed in visit on 08/08/22   XR Chest 2 Views     Status: None    Narrative    EXAM: XR CHEST 2 VIEWS  LOCATION: [facility]  DATE/TIME: 8/8/2022 1:06 PM    INDICATION:  Shortness of breath  COMPARISON: PA and lateral views of the chest 12/12/2021      Impression    IMPRESSION: Negative chest.   Results for orders placed or performed in visit on 08/08/22   CBC with platelets and differential     Status: None   Result Value Ref Range    WBC Count 9.8 4.0 - 11.0 10e3/uL    RBC Count 5.60 4.40 - 5.90 10e6/uL    Hemoglobin 17.1 13.3 - 17.7 g/dL    Hematocrit 49.5 40.0 - 53.0 %    MCV 88 78 - 100 fL     MCH 30.5 26.5 - 33.0 pg    MCHC 34.5 31.5 - 36.5 g/dL    RDW 12.4 10.0 - 15.0 %    Platelet Count 375 150 - 450 10e3/uL    % Neutrophils 74 %    % Lymphocytes 18 %    % Monocytes 6 %    % Eosinophils 1 %    % Basophils 0 %    % Immature Granulocytes 0 %    Absolute Neutrophils 7.2 1.6 - 8.3 10e3/uL    Absolute Lymphocytes 1.8 0.8 - 5.3 10e3/uL    Absolute Monocytes 0.6 0.0 - 1.3 10e3/uL    Absolute Eosinophils 0.1 0.0 - 0.7 10e3/uL    Absolute Basophils 0.0 0.0 - 0.2 10e3/uL    Absolute Immature Granulocytes 0.0 <=0.4 10e3/uL   CBC with platelets and differential     Status: None    Narrative    The following orders were created for panel order CBC with platelets and differential.  Procedure                               Abnormality         Status                     ---------                               -----------         ------                     CBC with platelets and d...[[id number]]                      Final result                 Please view results for these tests on the individual orders.       Answers for HPI/ROS submitted by the patient on 8/8/2022  Your back pain is: new  What do you think is the original cause of your back pain?: not sure  When did you first notice your back pain? : more than 1 month ago  How would you describe your back pain? : cramping, dull ache  How often do you feel your back pain? : constantly  Where is your back pain located? : right middle of back, left hip  Where does your back pain spread? : right side of neck  Since you noticed your back pain, how has it changed? : gradually worsening  Does your back pain interfere with your job?: Not applicable  On a scale of 1-10 (10 being the worst), how strong is your back pain?: 3  What makes your back pain worse? : certain positions, lying down  Acupuncture:: not tried  Acetaminophen: not helpful  Activity or Exercise: not helpful  Chiropractor: not tried  Cold: not helpful  Heat: not helpful  Massage: not tried  Muscle relaxants :  not tried  NSAIDS (Ibuprofen, Naproxen) : not tried  Opioids: not tried  Physical Therapy: not tried  Rest: not helpful  Steroid Injection: not tried  Stretching : helpful  Surgery: not tried  TENS Unit: not tried  Topical pain relievers : not helpful  Do you see any other healthcare providers for your back pain? : None  What is the reason for your visit today? : Sinus infection, vision going weak in right eye, right arm and finger going numb, legs hurting and pelvic pain.  How many servings of fruits and vegetables do you eat daily?: 0-1  On average, how many sweetened beverages do you drink each day (Examples: soda, juice, sweet tea, etc.  Do NOT count diet or artificially sweetened beverages)?: 1  How many minutes a day do you exercise enough to make your heart beat faster?: 9 or less  How many days a week do you exercise enough to make your heart beat faster?: 3 or less  How many days per week do you miss taking your medication?: 0

## 2022-10-04 DIAGNOSIS — J01.90 ACUTE SINUSITIS WITH SYMPTOMS > 10 DAYS: ICD-10-CM

## 2022-10-06 RX ORDER — FLUTICASONE PROPIONATE 50 MCG
SPRAY, SUSPENSION (ML) NASAL
Qty: 16 G | Refills: 0 | Status: SHIPPED | OUTPATIENT
Start: 2022-10-06 | End: 2023-05-15

## 2022-10-06 NOTE — TELEPHONE ENCOUNTER
Prescription approved per [facility] Refill Protocol.    Last Written Prescription Date: 8/8/22  Last Fill Quantity: 16g,  # refills: 0   Last office visit: 8/8/2022 with prescribing provider

## 2023-04-13 ENCOUNTER — OFFICE VISIT (OUTPATIENT)
Dept: FAMILY MEDICINE | Facility: CLINIC | Age: 33
End: 2023-04-13
Payer: MEDICAID

## 2023-04-13 VITALS
DIASTOLIC BLOOD PRESSURE: 84 MMHG | TEMPERATURE: 98.8 F | BODY MASS INDEX: 24.43 KG/M2 | WEIGHT: 152 LBS | HEIGHT: 66 IN | SYSTOLIC BLOOD PRESSURE: 120 MMHG | HEART RATE: 102 BPM | OXYGEN SATURATION: 96 % | RESPIRATION RATE: 20 BRPM

## 2023-04-13 DIAGNOSIS — J01.00 ACUTE NON-RECURRENT MAXILLARY SINUSITIS: Primary | ICD-10-CM

## 2023-04-13 PROBLEM — F31.10 BIPOLAR AFFECTIVE DISORDER, CURRENT EPISODE MANIC (H): Status: ACTIVE | Noted: 2017-07-11

## 2023-04-13 PROBLEM — F60.9 PERSONALITY DISORDER (H): Status: ACTIVE | Noted: 2017-07-11

## 2023-04-13 PROBLEM — F17.209 TOBACCO USE DISORDER, CONTINUOUS: Status: ACTIVE | Noted: 2023-04-13

## 2023-04-13 PROCEDURE — 99213 OFFICE O/P EST LOW 20 MIN: CPT | Performed by: FAMILY MEDICINE

## 2023-04-13 RX ORDER — AZITHROMYCIN 250 MG/1
TABLET, FILM COATED ORAL
Qty: 6 TABLET | Refills: 0 | Status: SHIPPED | OUTPATIENT
Start: 2023-04-13 | End: 2023-04-18

## 2023-04-13 ASSESSMENT — PAIN SCALES - GENERAL: PAINLEVEL: NO PAIN (0)

## 2023-04-13 ASSESSMENT — ENCOUNTER SYMPTOMS
WHEEZING: 1
SORE THROAT: 1
HEADACHES: 1

## 2023-04-13 NOTE — PROGRESS NOTES
Assessment & Plan     Acute non-recurrent maxillary sinusitis  Symptomatic care with analgesics, cough suppressants  Treat with azithromycin, increase fluticasone nasal spray to 2 sprays each nostril once daily and follow-up if symptoms are not improving  - azithromycin (ZITHROMAX) 250 MG tablet; Take 2 tablets (500 mg) by mouth daily for 1 day, THEN 1 tablet (250 mg) daily for 4 days.                 Brian Jarvis MD  [facility]    Nellie Mathur is a 32 year old, presenting for the following health issues:  Allergies (Rhinorrhea and irritation on the face. Ongoing sinus issues x2 years.)        4/13/2023    10:49 AM   Additional Questions   Roomed by SRud   Accompanied by n/a     Allergies  This is a recurrent problem. The current episode started more than 1 year ago. The problem occurs daily. The problem has been gradually worsening. Associated symptoms include congestion, headaches and a sore throat.   History of Present Illness     Asthma:  He presents for follow up of asthma.  He has some cough, some wheezing, and no shortness of breath. He is not using a relief medication. He does not have a controller medication. Patient is aware of the following triggers: unaware of any triggers, pollens and upper respiratory infections. The patient has not had a visit to the Emergency Room, Urgent Care or Hospital due to asthma since the last clinic visit.     Reason for visit:  Sinius and allergys    He eats 0-1 servings of fruits and vegetables daily.He consumes 2 sweetened beverage(s) daily.He exercises with enough effort to increase his heart rate 10 to 19 minutes per day.  He exercises with enough effort to increase his heart rate 4 days per week.   He is taking medications regularly.               Review of Systems   HENT: Positive for congestion and sore throat.    Respiratory: Positive for wheezing.    Neurological: Positive for headaches.            Objective    /84  "(BP Location: Right arm, Patient Position: Sitting)   Pulse 102   Temp 98.8  F (37.1  C) (Tympanic)   Resp 20   Ht 1.676 m (5' 6\")   Wt 68.9 kg (152 lb)   SpO2 96%   BMI 24.53 kg/m    Body mass index is 24.53 kg/m .  Physical Exam   PE uri/sinus     General:  Alert and oriented, No acute distress.     Eye:  Normal conjunctiva.     HENT:  Normocephalic, Tympanic membranes are clear, Oral mucosa is moist, No pharyngeal erythema.        Sinus: maxillary tenderness        Throat: Pharynx (posterior drainage).     Neck:  Supple, Non-tender, No lymphadenopathy.     Respiratory:  Lungs are clear to auscultation; Respirations are non-labored.     Cardiovascular:  Normal rate, Regular rhythm.     Integumentary:  Warm, Dry, No rash  Psychiatric:  Cooperative, Appropriate mood & affect.                       "

## 2023-05-15 DIAGNOSIS — J01.90 ACUTE SINUSITIS WITH SYMPTOMS > 10 DAYS: ICD-10-CM

## 2023-05-15 RX ORDER — FLUTICASONE PROPIONATE 50 MCG
SPRAY, SUSPENSION (ML) NASAL
Qty: 16 G | Refills: 0 | Status: SHIPPED | OUTPATIENT
Start: 2023-05-15 | End: 2024-01-10

## 2023-05-15 NOTE — TELEPHONE ENCOUNTER
"    Last office visit: 4/13/2023     Future Appointments 5/15/2023 - 11/11/2023    None          Requested Prescriptions   Pending Prescriptions Disp Refills     fluticasone (FLONASE) 50 MCG/ACT nasal spray [Pharmacy Med Name: FLUTICASONE 50MCG NASAL SP (120) RX] 16 g 0     Sig: SHAKE LIQUID AND USE 1 SPRAY IN EACH NOSTRIL DAILY.       Nasal Allergy Protocol Passed - 5/15/2023  9:59 AM        Passed - Patient is age 12 or older        Passed - Recent (12 mo) or future (30 days) visit within the authorizing provider's specialty     Patient has had an office visit with the authorizing provider or a provider within the authorizing providers department within the previous 12 mos or has a future within next 30 days. See \"Patient Info\" tab in inbasket, or \"Choose Columns\" in Meds & Orders section of the refill encounter.              Passed - Medication is active on med list                   "

## 2023-06-13 ENCOUNTER — OFFICE VISIT (OUTPATIENT)
Dept: FAMILY MEDICINE | Facility: CLINIC | Age: 33
End: 2023-06-13
Payer: MEDICAID

## 2023-06-13 VITALS
OXYGEN SATURATION: 96 % | BODY MASS INDEX: 25.07 KG/M2 | HEIGHT: 66 IN | HEART RATE: 80 BPM | TEMPERATURE: 98.5 F | RESPIRATION RATE: 16 BRPM | DIASTOLIC BLOOD PRESSURE: 80 MMHG | SYSTOLIC BLOOD PRESSURE: 120 MMHG | WEIGHT: 156 LBS

## 2023-06-13 DIAGNOSIS — J01.01 ACUTE RECURRENT MAXILLARY SINUSITIS: Primary | ICD-10-CM

## 2023-06-13 DIAGNOSIS — L24.81 IRRITANT CONTACT DERMATITIS DUE TO METALS: ICD-10-CM

## 2023-06-13 PROCEDURE — 99213 OFFICE O/P EST LOW 20 MIN: CPT | Performed by: PHYSICIAN ASSISTANT

## 2023-06-13 RX ORDER — AZITHROMYCIN 250 MG/1
TABLET, FILM COATED ORAL
Qty: 6 TABLET | Refills: 0 | Status: SHIPPED | OUTPATIENT
Start: 2023-06-13 | End: 2023-06-18

## 2023-06-13 RX ORDER — PREDNISONE 20 MG/1
40 TABLET ORAL DAILY
Qty: 10 TABLET | Refills: 0 | Status: SHIPPED | OUTPATIENT
Start: 2023-06-13 | End: 2023-06-13 | Stop reason: SINTOL

## 2023-06-13 RX ORDER — TRIAMCINOLONE ACETONIDE 1 MG/G
CREAM TOPICAL 2 TIMES DAILY
Qty: 30 G | Refills: 1 | Status: SHIPPED | OUTPATIENT
Start: 2023-06-13

## 2023-06-13 RX ORDER — ALBUTEROL SULFATE 90 UG/1
2 AEROSOL, METERED RESPIRATORY (INHALATION) EVERY 6 HOURS PRN
Qty: 18 G | Refills: 3 | Status: SHIPPED | OUTPATIENT
Start: 2023-06-13 | End: 2024-01-10

## 2023-06-13 ASSESSMENT — ENCOUNTER SYMPTOMS
RHINORRHEA: 1
CHILLS: 1
SINUS PAIN: 1
WHEEZING: 0
COUGH: 1

## 2023-06-13 NOTE — PROGRESS NOTES
"  1. Acute recurrent maxillary sinusitis  Will treat with zpak, continue with flonase, consider saline nasal spray,  Use albuterol MDI as needed  He cannot take prednisone due to major panic attacks while on it    - azithromycin (ZITHROMAX) 250 MG tablet; Take 2 tablets (500 mg) by mouth daily for 1 day, THEN 1 tablet (250 mg) daily for 4 days.  Dispense: 6 tablet; Refill: 0  - albuterol (PROAIR HFA/PROVENTIL HFA/VENTOLIN HFA) 108 (90 Base) MCG/ACT inhaler; Inhale 2 puffs into the lungs every 6 hours as needed for shortness of breath, wheezing or cough  Dispense: 18 g; Refill: 3    2. Irritant contact dermatitis due to metals  Advised to use benadryl at bedtime, loratadine in the morning  And will use triamcinolone cream for the hot spots  - triamcinolone (KENALOG) 0.1 % external cream; Apply topically 2 times daily Can use for up to 2 weeks  Dispense: 30 g; Refill: 1      Subjective   Kareen is a 32 year old, presenting for the following health issues:  Sinus Problem, Cough (Pt c/o sinus pressure and productive cough with \"grey stuff\" coming from nose x 3 weeks.  Pt states he is breathing in \"metal dust or shavings\" at his work), and Derm Problem (Pt also has itchy rash on both wrists,hands.arms and legs x 3 weeks)        4/13/2023    10:49 AM   Additional Questions   Roomed by SRud   Accompanied by n/a     Sinus Problem   Associated symptoms include chills, congestion and cough.   Cough  Associated symptoms include chills and rhinorrhea. Pertinent negatives include no wheezing.   History of Present Illness     Asthma:  He presents for follow up of asthma.  He has some cough, some wheezing, and some shortness of breath. He is not using a relief medication. He does not have a controller medication. Patient is aware of the following triggers: upper respiratory infections. The patient has not had a visit to the Emergency Room, Urgent Care or Hospital due to asthma since the last clinic visit.     He eats 0-1 servings " "of fruits and vegetables daily.He consumes 2 sweetened beverage(s) daily.He exercises with enough effort to increase his heart rate 30 to 60 minutes per day.  He exercises with enough effort to increase his heart rate 4 days per week.   He is taking medications regularly.             He has been working for [employer] 3 weeks ago where they bend [employer]  And he is exposed to metal dust all day  He has been having sinus pressure, rhinorrhea, cough since he started working there  He uses Flonase sometimes    He also has itchy rash on arms, wrist and ankles presumably from the metal dust as well, he has been using hydrocortisone cream on it, he has been waking up at night itching    Review of Systems   Constitutional: Positive for chills.   HENT: Positive for congestion, rhinorrhea and sinus pain.    Respiratory: Positive for cough. Negative for wheezing.    Skin: Positive for rash.            Objective    /80 (BP Location: Right arm, Patient Position: Sitting, Cuff Size: Adult Regular)   Pulse 80   Temp 98.5  F (36.9  C) (Tympanic)   Resp 16   Ht 1.676 m (5' 6\")   Wt 70.8 kg (156 lb)   SpO2 96%   BMI 25.18 kg/m    Body mass index is 25.18 kg/m .  Physical Exam  Vitals reviewed.   Constitutional:       Appearance: Normal appearance.   HENT:      Head:      Comments: Maxillary sinus tenderness     Right Ear: Tympanic membrane normal.      Left Ear: Tympanic membrane normal.      Nose:      Comments: Moderate inflammation of nares bilaterally     Mouth/Throat:      Mouth: Mucous membranes are moist.      Pharynx: Oropharynx is clear. No oropharyngeal exudate or posterior oropharyngeal erythema.   Cardiovascular:      Rate and Rhythm: Normal rate and regular rhythm.      Pulses: Normal pulses.      Heart sounds: Normal heart sounds.   Pulmonary:      Effort: Pulmonary effort is normal.      Breath sounds: Normal breath sounds.   Musculoskeletal:      Cervical back: Normal range of motion. No tenderness. "   Lymphadenopathy:      Cervical: No cervical adenopathy.   Skin:     Comments: Red irritated rash with small papules present on wrists, ankles, left upper arm   Neurological:      Mental Status: He is alert.

## 2024-01-10 ENCOUNTER — OFFICE VISIT (OUTPATIENT)
Dept: FAMILY MEDICINE | Facility: CLINIC | Age: 34
End: 2024-01-10
Payer: MEDICAID

## 2024-01-10 VITALS
BODY MASS INDEX: 26.13 KG/M2 | HEART RATE: 97 BPM | RESPIRATION RATE: 16 BRPM | WEIGHT: 162.6 LBS | TEMPERATURE: 98.3 F | OXYGEN SATURATION: 96 % | SYSTOLIC BLOOD PRESSURE: 102 MMHG | HEIGHT: 66 IN | DIASTOLIC BLOOD PRESSURE: 74 MMHG

## 2024-01-10 DIAGNOSIS — Z30.09 ENCOUNTER FOR VASECTOMY COUNSELING: ICD-10-CM

## 2024-01-10 DIAGNOSIS — Z00.00 HEALTHCARE MAINTENANCE: Primary | ICD-10-CM

## 2024-01-10 DIAGNOSIS — Z13.220 LIPID SCREENING: ICD-10-CM

## 2024-01-10 DIAGNOSIS — Z13.1 SCREENING FOR DIABETES MELLITUS: ICD-10-CM

## 2024-01-10 DIAGNOSIS — J01.01 ACUTE RECURRENT MAXILLARY SINUSITIS: ICD-10-CM

## 2024-01-10 PROCEDURE — 99213 OFFICE O/P EST LOW 20 MIN: CPT | Mod: 25 | Performed by: FAMILY MEDICINE

## 2024-01-10 PROCEDURE — 82947 ASSAY GLUCOSE BLOOD QUANT: CPT | Mod: QW | Performed by: FAMILY MEDICINE

## 2024-01-10 PROCEDURE — 36415 COLL VENOUS BLD VENIPUNCTURE: CPT | Performed by: FAMILY MEDICINE

## 2024-01-10 PROCEDURE — 99395 PREV VISIT EST AGE 18-39: CPT | Performed by: FAMILY MEDICINE

## 2024-01-10 PROCEDURE — 80061 LIPID PANEL: CPT | Performed by: FAMILY MEDICINE

## 2024-01-10 RX ORDER — FLUTICASONE PROPIONATE 50 MCG
1 SPRAY, SUSPENSION (ML) NASAL DAILY
Qty: 16 G | Refills: 3 | Status: SHIPPED | OUTPATIENT
Start: 2024-01-10 | End: 2024-04-30

## 2024-01-10 RX ORDER — ALBUTEROL SULFATE 90 UG/1
2 AEROSOL, METERED RESPIRATORY (INHALATION) EVERY 6 HOURS PRN
Qty: 18 G | Refills: 3 | Status: SHIPPED | OUTPATIENT
Start: 2024-01-10

## 2024-01-10 ASSESSMENT — ENCOUNTER SYMPTOMS
WEAKNESS: 0
FEVER: 0
HEARTBURN: 0
NAUSEA: 0
HEMATOCHEZIA: 0
ABDOMINAL PAIN: 0
SHORTNESS OF BREATH: 0
COUGH: 0
DIZZINESS: 0
HEADACHES: 0
JOINT SWELLING: 0
ARTHRALGIAS: 0
PARESTHESIAS: 0
NERVOUS/ANXIOUS: 0
PALPITATIONS: 0
DYSURIA: 0
FREQUENCY: 0
DIARRHEA: 0
SORE THROAT: 0
MYALGIAS: 0
HEMATURIA: 0
EYE PAIN: 0
CONSTIPATION: 0

## 2024-01-10 NOTE — LETTER
January 11, 2024      Kareen MARTINEZ Karen  [address] [address]        Dear ,    We are writing to inform you of your test results.    Your cholesterol results show slight elevation of the bad cholesterol or LDL.  No treatment is needed at this time.    Resulted Orders   Lipid panel reflex to direct LDL Fasting   Result Value Ref Range    Cholesterol 236 (H) <200 mg/dL    Triglycerides 127 <150 mg/dL    Direct Measure HDL 45 >=40 mg/dL    LDL Cholesterol Calculated 166 (H) <=100 mg/dL    Non HDL Cholesterol 191 (H) <130 mg/dL    Patient Fasting > 8hrs? No     Narrative    Cholesterol  Desirable:  <200 mg/dL    Triglycerides  Normal:  Less than 150 mg/dL  Borderline High:  150-199 mg/dL  High:  200-499 mg/dL  Very High:  Greater than or equal to 500 mg/dL    Direct Measure HDL  Female:  Greater than or equal to 50 mg/dL   Male:  Greater than or equal to 40 mg/dL    LDL Cholesterol  Desirable:  <100mg/dL  Above Desirable:  100-129 mg/dL   Borderline High:  130-159 mg/dL   High:  160-189 mg/dL   Very High:  >= 190 mg/dL    Non HDL Cholesterol  Desirable:  130 mg/dL  Above Desirable:  130-159 mg/dL  Borderline High:  160-189 mg/dL  High:  190-219 mg/dL  Very High:  Greater than or equal to 220 mg/dL   Glucose   Result Value Ref Range    Glucose 105 (H) 70 - 99 mg/dL    Patient Fasting > 8hrs? No        If you have any questions or concerns, please call the clinic at the number listed above.       Sincerely,      Brian Jarvis MD

## 2024-01-10 NOTE — PROGRESS NOTES
"SUBJECTIVE:   Kareen is a 33 year old, presenting for the following:  Physical (Annual px.  Still c/o having sinus problems--constant nasal drainage.)        1/10/2024    10:54 AM   Additional Questions   Roomed by Lucretia TIPTON CMA   Accompanied by Self       Healthy Habits:     Getting at least 3 servings of Calcium per day:  NO    Bi-annual eye exam:  Yes    Dental care twice a year:  NO    Sleep apnea or symptoms of sleep apnea:  None    Diet:  Regular (no restrictions)    Frequency of exercise:  None    Taking medications regularly:  Not Applicable    Medication side effects:  Not applicable    Additional concerns today:  No    Patient is here today for annual exam.  He would like a referral to ENT.  He has chronic sinus issues.  He is also requesting a consultation regarding a vasectomy.    .  Today's PHQ-2 Score:       1/10/2024    10:52 AM   PHQ-2 ( 1999 [employer])   Q1: Little interest or pleasure in doing things 1   Q2: Feeling down, depressed or hopeless 1   PHQ-2 Score 2   Q1: Little interest or pleasure in doing things Several days   Q2: Feeling down, depressed or hopeless Several days   PHQ-2 Score 2         Have you ever done Advance Care Planning? (For example, a Health Directive, POLST, or a discussion with a medical provider or your loved ones about your wishes): No, advance care planning information given to patient to review.  Patient declined advance care planning discussion at this time.    Social History     Tobacco Use    Smoking status: Every Day     Packs/day: .25     Types: Cigarettes     Start date: 8/1/2023     Passive exposure: Current    Smokeless tobacco: Never   Substance Use Topics    Alcohol use: Not on file             1/10/2024    10:52 AM   Alcohol Use   Prescreen: >3 drinks/day or >7 drinks/week? No          No data to display                Last PSA: No results found for: \"PSA\"    Reviewed orders with patient. Reviewed health maintenance and updated orders accordingly - " "Yes      Reviewed and updated as needed this visit by clinical staff   Tobacco  Allergies  Meds              Reviewed and updated as needed this visit by Provider                     Review of Systems   Constitutional:  Negative for fever.   HENT:  Positive for congestion and hearing loss. Negative for ear pain and sore throat.    Eyes:  Negative for pain and visual disturbance.   Respiratory:  Negative for cough and shortness of breath.    Cardiovascular:  Negative for chest pain, palpitations and peripheral edema.   Gastrointestinal:  Negative for abdominal pain, constipation, diarrhea, heartburn, hematochezia and nausea.   Genitourinary:  Positive for urgency. Negative for dysuria, frequency, genital sores, hematuria, impotence and penile discharge.   Musculoskeletal:  Negative for arthralgias, joint swelling and myalgias.   Skin:  Negative for rash.   Neurological:  Negative for dizziness, weakness, headaches and paresthesias.   Psychiatric/Behavioral:  Negative for mood changes. The patient is not nervous/anxious.          OBJECTIVE:   /74 (BP Location: Right arm, Patient Position: Sitting, Cuff Size: Adult Large)   Pulse 97   Temp 98.3  F (36.8  C) (Tympanic)   Resp 16   Ht 1.676 m (5' 6\")   Wt 73.8 kg (162 lb 9.6 oz)   SpO2 96%   BMI 26.24 kg/m      Physical Exam  GENERAL: healthy, alert and no distress  EYES: Eyes grossly normal to inspection, PERRL and conjunctivae and sclerae normal  HENT: ear canals and TM's normal, nose and mouth without ulcers or lesions  NECK: no adenopathy, no asymmetry, masses, or scars and thyroid normal to palpation  RESP: lungs clear to auscultation - no rales, rhonchi or wheezes  CV: regular rate and rhythm, normal S1 S2, no S3 or S4, no murmur, click or rub, no peripheral edema and peripheral pulses strong  ABDOMEN: soft, nontender, no hepatosplenomegaly, no masses and bowel sounds normal  MS: no gross musculoskeletal defects noted, no edema  PSYCH: mentation " "appears normal, affect normal/bright    Diagnostic Test Results:  Labs reviewed in Epic    ASSESSMENT/PLAN:   Kareen was seen today for physical.    Diagnoses and all orders for this visit:    Healthcare maintenance    Acute recurrent maxillary sinusitis  -     albuterol (PROAIR HFA/PROVENTIL HFA/VENTOLIN HFA) 108 (90 Base) MCG/ACT inhaler; Inhale 2 puffs into the lungs every 6 hours as needed for shortness of breath, wheezing or cough  -     Adult ENT  Referral; Future    Lipid screening  -     Lipid panel reflex to direct LDL Fasting; Future    Screening for diabetes mellitus  -     Glucose; Future    Encounter for vasectomy counseling   We discussed the vasectomy procedure and follow up. He was counseled about immediate follow up after the procedure and complications including, infection, bleeding, sperm granuloma, sperm antibodies, testicular congestion, epididymal discomfort, and spontaneous recanalization. Patient expressed understanding.    Other orders  -     REVIEW OF HEALTH MAINTENANCE PROTOCOL ORDERS  -     fluticasone (FLONASE) 50 MCG/ACT nasal spray; Spray 1 spray into both nostrils daily        Patient has been advised of split billing requirements and indicates understanding: Yes      COUNSELING:   Reviewed preventive health counseling, as reflected in patient instructions       Regular exercise       Healthy diet/nutrition       Alcohol Use        Family planning       Safe sex practices/STD prevention      BMI:   Estimated body mass index is 26.24 kg/m  as calculated from the following:    Height as of this encounter: 1.676 m (5' 6\").    Weight as of this encounter: 73.8 kg (162 lb 9.6 oz).         He reports that he has been smoking cigarettes. He started smoking about 5 months ago. He has been smoking an average of 0.3 packs per day. He has been exposed to tobacco smoke. He has never used smokeless tobacco.            Brian Jarvis MD  [facility]  "

## 2024-01-11 LAB
CHOLEST SERPL-MCNC: 236 MG/DL
FASTING STATUS PATIENT QL REPORTED: NO
FASTING STATUS PATIENT QL REPORTED: NO
GLUCOSE SERPL-MCNC: 105 MG/DL (ref 70–99)
HDLC SERPL-MCNC: 45 MG/DL
LDLC SERPL CALC-MCNC: 166 MG/DL
NONHDLC SERPL-MCNC: 191 MG/DL
TRIGL SERPL-MCNC: 127 MG/DL

## 2024-04-30 ENCOUNTER — OFFICE VISIT (OUTPATIENT)
Dept: FAMILY MEDICINE | Facility: CLINIC | Age: 34
End: 2024-04-30
Payer: MEDICAID

## 2024-04-30 VITALS
DIASTOLIC BLOOD PRESSURE: 76 MMHG | HEART RATE: 98 BPM | SYSTOLIC BLOOD PRESSURE: 111 MMHG | BODY MASS INDEX: 23.88 KG/M2 | TEMPERATURE: 99.4 F | RESPIRATION RATE: 16 BRPM | OXYGEN SATURATION: 99 % | WEIGHT: 148.6 LBS | HEIGHT: 66 IN

## 2024-04-30 DIAGNOSIS — J34.89 FRONTAL SINUS PAIN: Primary | ICD-10-CM

## 2024-04-30 PROCEDURE — 99213 OFFICE O/P EST LOW 20 MIN: CPT | Performed by: FAMILY MEDICINE

## 2024-04-30 RX ORDER — FLUTICASONE PROPIONATE 50 MCG
1 SPRAY, SUSPENSION (ML) NASAL DAILY
Qty: 16 G | Refills: 5 | Status: SHIPPED | OUTPATIENT
Start: 2024-04-30

## 2024-04-30 NOTE — PROGRESS NOTES
"  Assessment & Plan     Frontal sinus pain  Patient will continue with fluticasone nasal spray.  Referral back to ENT for further evaluation and treatment.  I suspect he has anatomic issues that are causing him problems.  He may benefit from further imaging as well.  - fluticasone (FLONASE) 50 MCG/ACT nasal spray; Spray 1 spray into both nostrils daily  - Adult ENT  Referral; Future            Nicotine/Tobacco Cessation  He reports that he has been smoking cigarettes. He started smoking about 8 months ago. He has a 0.2 pack-year smoking history. He has been exposed to tobacco smoke. He has never used smokeless tobacco.  Nicotine/Tobacco Cessation Plan  Self help information given to patient            Nellie Mathur is a 33 year old, presenting for the following health issues:  Sinus Problem (C/o ongoing sinus infection)        4/30/2024     1:00 PM   Additional Questions   Roomed by Lucretia TIPTON CMA   Accompanied by Self     History of Present Illness       Reason for visit:  Sinus infection    He eats 0-1 servings of fruits and vegetables daily.He consumes 2 sweetened beverage(s) daily.He exercises with enough effort to increase his heart rate 9 or less minutes per day.  He exercises with enough effort to increase his heart rate 3 or less days per week.   He is taking medications regularly.       Patient is here to discuss issues with his sinuses.   He has history of sinus infections for the past 2-3 years.   He does report having breathing in old rust from rhibar at his former job, he has quit since.          Review of Systems  Constitutional, HEENT, cardiovascular, pulmonary, gi and gu systems are negative, except as otherwise noted.      Objective    /76 (BP Location: Right arm, Patient Position: Sitting, Cuff Size: Adult Regular)   Pulse 98   Temp 99.4  F (37.4  C) (Tympanic)   Resp 16   Ht 1.676 m (5' 6\")   Wt 67.4 kg (148 lb 9.6 oz)   SpO2 99%   BMI 23.98 kg/m    Body mass index is 23.98 " kg/m .  Physical Exam   GENERAL: alert and no distress  NECK: no adenopathy, no asymmetry, masses, or scars  RESP: lungs clear to auscultation - no rales, rhonchi or wheezes  CV: regular rate and rhythm, normal S1 S2, no S3 or S4, no murmur, click or rub, no peripheral edema  MS: no gross musculoskeletal defects noted, no edema            Signed Electronically by: Brian Jarvis MD

## 2024-05-07 DIAGNOSIS — J34.89 FRONTAL SINUS PAIN: ICD-10-CM

## 2024-05-07 RX ORDER — FLUTICASONE PROPIONATE 50 MCG
1 SPRAY, SUSPENSION (ML) NASAL DAILY
Qty: 32 G | OUTPATIENT
Start: 2024-05-07

## 2024-05-15 ENCOUNTER — TELEPHONE (OUTPATIENT)
Dept: FAMILY MEDICINE | Facility: CLINIC | Age: 34
End: 2024-05-15
Payer: MEDICAID

## 2024-05-15 DIAGNOSIS — J01.01 ACUTE RECURRENT MAXILLARY SINUSITIS: Primary | ICD-10-CM

## 2024-05-15 NOTE — TELEPHONE ENCOUNTER
General Call      Reason for Call:  Expedite referral     What are your questions or concerns:  Pt cannot get into ENT clinic at a reasonable time. He is requesting referral be marked urgent as he is having major sinus issue and would like to get in sooner than 6mo     Date of last appointment with provider: 4/30/24Merlin Jarvis     Okay to leave a detailed message?: Yes at Home number on file [phone]

## 2024-10-25 ENCOUNTER — OFFICE VISIT (OUTPATIENT)
Dept: FAMILY MEDICINE | Facility: CLINIC | Age: 34
End: 2024-10-25
Payer: MEDICAID

## 2024-10-25 VITALS
SYSTOLIC BLOOD PRESSURE: 102 MMHG | HEIGHT: 66 IN | WEIGHT: 147 LBS | BODY MASS INDEX: 23.63 KG/M2 | TEMPERATURE: 98.3 F | DIASTOLIC BLOOD PRESSURE: 58 MMHG | HEART RATE: 103 BPM | OXYGEN SATURATION: 99 % | RESPIRATION RATE: 20 BRPM

## 2024-10-25 DIAGNOSIS — J18.9 WALKING PNEUMONIA: Primary | ICD-10-CM

## 2024-10-25 PROCEDURE — 99213 OFFICE O/P EST LOW 20 MIN: CPT

## 2024-10-25 RX ORDER — AZITHROMYCIN 250 MG/1
TABLET, FILM COATED ORAL
Qty: 6 TABLET | Refills: 0 | Status: SHIPPED | OUTPATIENT
Start: 2024-10-25 | End: 2024-10-30

## 2024-10-25 RX ORDER — FLUTICASONE PROPIONATE 110 UG/1
1 AEROSOL, METERED RESPIRATORY (INHALATION) 2 TIMES DAILY
Qty: 12 G | Refills: 0 | Status: SHIPPED | OUTPATIENT
Start: 2024-10-25

## 2024-10-25 ASSESSMENT — ENCOUNTER SYMPTOMS
MYALGIAS: 1
SORE THROAT: 1
SHORTNESS OF BREATH: 1
WHEEZING: 1
RHINORRHEA: 1
HEADACHES: 1
COUGH: 1

## 2024-10-25 ASSESSMENT — ASTHMA QUESTIONNAIRES: ACT_TOTALSCORE: 18

## 2024-10-25 ASSESSMENT — LIFESTYLE VARIABLES: SMOKING_STATUS: 1

## 2024-10-25 NOTE — PROGRESS NOTES
Assessment & Plan     Walking pneumonia  Cough for 1.5 months since having COVID in September.  Decreased breath sounds in left upper lobe, otherwise lung sounds clear.  No wheezes heard but patient reports wheezing.  Patient has not previously tolerated prednisone, will do inhaled prednisone and also treat for potential walking pneumonia due to current increase in mycoplasma pneumonia cases in the area with azithromycin.  Patient to notify clinic if not improving and will do chest x-ray  - fluticasone (FLOVENT HFA) 110 MCG/ACT inhaler; Inhale 1 puff into the lungs 2 times daily.  - azithromycin (ZITHROMAX) 250 MG tablet; Take 2 tablets (500 mg) by mouth daily for 1 day, THEN 1 tablet (250 mg) daily for 4 days.            Subjective   Kareen is a 33 year old, presenting for the following health issues:  Cough (X1.5 months. Had covid in September. SOB, wheezing and congestion. )      10/25/2024     8:52 AM   Additional Questions   Roomed by srud   Accompanied by n/a     Has not been feeling well, has not been eating     History of Present Illness       Reason for visit:  Bronchitis or long covid    He eats 0-1 servings of fruits and vegetables daily.He consumes 2 sweetened beverage(s) daily.He exercises with enough effort to increase his heart rate 9 or less minutes per day.  He exercises with enough effort to increase his heart rate 3 or less days per week.   He is taking medications regularly.         Asthma Follow-Up    Was ACT completed today?  Yes        10/25/2024     8:59 AM   ACT Total Scores   ACT TOTAL SCORE (Goal Greater than or Equal to 20) 18   In the past 12 months, how many times did you visit the emergency room for your asthma without being admitted to the hospital? 0   In the past 12 months, how many times were you hospitalized overnight because of your asthma? 0       How many days per week do you miss taking your asthma controller medication?  I do not have an asthma controller medication  Please  "describe any recent triggers for your asthma: upper respiratory infections  Have you had any Emergency Room Visits, Urgent Care Visits, or Hospital Admissions since your last office visit?  No            Objective    /58 (BP Location: Right arm, Patient Position: Sitting)   Pulse 103   Temp 98.3  F (36.8  C) (Tympanic)   Resp 20   Ht 1.676 m (5' 6\")   Wt 66.7 kg (147 lb)   SpO2 99%   BMI 23.73 kg/m    Body mass index is 23.73 kg/m .  Physical Exam  HENT:      Head: Normocephalic.      Mouth/Throat:      Mouth: Mucous membranes are moist.   Eyes:      Extraocular Movements: Extraocular movements intact.      Conjunctiva/sclera: Conjunctivae normal.   Cardiovascular:      Rate and Rhythm: Normal rate and regular rhythm.   Pulmonary:      Effort: Pulmonary effort is normal. No respiratory distress.      Breath sounds: No wheezing.      Comments: Decreased breath sounds left upper lobe  Abdominal:      Palpations: Abdomen is soft.   Skin:     General: Skin is warm and dry.      Capillary Refill: Capillary refill takes less than 2 seconds.   Neurological:      Mental Status: He is alert and oriented to person, place, and time.   Psychiatric:         Behavior: Behavior normal.         Thought Content: Thought content normal.                    Signed Electronically by: CLEMENTE Garcia CNP    "

## 2024-11-26 ENCOUNTER — OFFICE VISIT (OUTPATIENT)
Dept: FAMILY MEDICINE | Facility: CLINIC | Age: 34
End: 2024-11-26
Payer: MEDICAID

## 2024-11-26 ENCOUNTER — ANCILLARY PROCEDURE (OUTPATIENT)
Dept: GENERAL RADIOLOGY | Facility: CLINIC | Age: 34
End: 2024-11-26
Payer: MEDICAID

## 2024-11-26 VITALS
BODY MASS INDEX: 23.7 KG/M2 | SYSTOLIC BLOOD PRESSURE: 126 MMHG | HEIGHT: 66 IN | DIASTOLIC BLOOD PRESSURE: 76 MMHG | TEMPERATURE: 99.1 F | HEART RATE: 91 BPM | WEIGHT: 147.5 LBS | OXYGEN SATURATION: 97 %

## 2024-11-26 DIAGNOSIS — R05.8 COUGH PRESENT FOR GREATER THAN 3 WEEKS: ICD-10-CM

## 2024-11-26 DIAGNOSIS — R11.0 NAUSEA: Primary | ICD-10-CM

## 2024-11-26 PROCEDURE — 71046 X-RAY EXAM CHEST 2 VIEWS: CPT | Mod: TC | Performed by: RADIOLOGY

## 2024-11-26 PROCEDURE — 99214 OFFICE O/P EST MOD 30 MIN: CPT

## 2024-11-26 RX ORDER — ONDANSETRON 4 MG/1
4 TABLET, ORALLY DISINTEGRATING ORAL EVERY 8 HOURS PRN
Qty: 20 TABLET | Refills: 0 | Status: SHIPPED | OUTPATIENT
Start: 2024-11-26

## 2024-11-26 ASSESSMENT — ANXIETY QUESTIONNAIRES
3. WORRYING TOO MUCH ABOUT DIFFERENT THINGS: NOT AT ALL
1. FEELING NERVOUS, ANXIOUS, OR ON EDGE: NOT AT ALL
7. FEELING AFRAID AS IF SOMETHING AWFUL MIGHT HAPPEN: NOT AT ALL
6. BECOMING EASILY ANNOYED OR IRRITABLE: NOT AT ALL
GAD7 TOTAL SCORE: 0
8. IF YOU CHECKED OFF ANY PROBLEMS, HOW DIFFICULT HAVE THESE MADE IT FOR YOU TO DO YOUR WORK, TAKE CARE OF THINGS AT HOME, OR GET ALONG WITH OTHER PEOPLE?: NOT DIFFICULT AT ALL
4. TROUBLE RELAXING: NOT AT ALL
IF YOU CHECKED OFF ANY PROBLEMS ON THIS QUESTIONNAIRE, HOW DIFFICULT HAVE THESE PROBLEMS MADE IT FOR YOU TO DO YOUR WORK, TAKE CARE OF THINGS AT HOME, OR GET ALONG WITH OTHER PEOPLE: NOT DIFFICULT AT ALL
5. BEING SO RESTLESS THAT IT IS HARD TO SIT STILL: NOT AT ALL
2. NOT BEING ABLE TO STOP OR CONTROL WORRYING: NOT AT ALL
7. FEELING AFRAID AS IF SOMETHING AWFUL MIGHT HAPPEN: NOT AT ALL

## 2024-11-26 ASSESSMENT — PATIENT HEALTH QUESTIONNAIRE - PHQ9
10. IF YOU CHECKED OFF ANY PROBLEMS, HOW DIFFICULT HAVE THESE PROBLEMS MADE IT FOR YOU TO DO YOUR WORK, TAKE CARE OF THINGS AT HOME, OR GET ALONG WITH OTHER PEOPLE: SOMEWHAT DIFFICULT
SUM OF ALL RESPONSES TO PHQ QUESTIONS 1-9: 9
SUM OF ALL RESPONSES TO PHQ QUESTIONS 1-9: 9

## 2024-11-26 NOTE — PROGRESS NOTES
Assessment & Plan     Nausea  Patient has been experiencing nausea for approximately past week.  He is also had diarrhea.  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  Also discussed that recent antibiotic may have contributed to the symptoms.  Also discussed postnasal drip from ongoing sinus and cough symptoms no abdominal tenderness on exam.  Will treat nausea and continue to monitor.  - ondansetron (ZOFRAN ODT) 4 MG ODT tab; Take 1 tablet (4 mg) by mouth every 8 hours as needed for nausea.    Cough present for greater than 3 weeks  Cough for greater than 3 weeks.  Patient does have history of recurrent sinusitis.  There is no sinus tenderness today, patient has recently seen ENT and was not found to have a sinus infection.  They recommended the patient quit smoking and felt that there was some inflammation and damage due to smoking.  Will do chest x-ray today although lung sounds clear.  Patient was previously treated with azithromycin.  - XR Chest 2 Views; Future          Depression Screening Follow Up        11/26/2024    10:32 AM   PHQ   PHQ-9 Total Score 9    Q9: Thoughts of better off dead/self-harm past 2 weeks Several days    F/U: Thoughts of suicide or self-harm No    F/U: Safety concerns No        Patient-reported                     Follow Up Actions Taken  Crisis resource information provided in the After Visit Summary  Patient declined referral.    Discussed the following ways the patient can remain in a safe environment:  be around others        Nellie Mathur is a 34 year old, presenting for the following health issues: believes that he still has pneumonia since visit the end of October, would like a chest xray, symptoms listed below  Cough, Sinus Problem, and Headache        11/26/2024    10:36 AM   Additional Questions   Roomed by luis enrique ascencio   Accompanied by self     HPI     GAD7 score: 0     PATIENT  HEALTH QUESTIONNAIRE-9 (PHQ - 9)    Over the last 2 weeks, how often have you been bothered by any of the following problems?    1. Little interest or pleasure in doing things -  (Patient-Rptd) Several days   2. Feeling down, depressed, or hopeless -  (Patient-Rptd) Several days   3. Trouble falling or staying asleep, or sleeping too much - (Patient-Rptd) Several days   4. Feeling tired or having little energy -  (Patient-Rptd) Several days   5. Poor appetite or overeating -  (Patient-Rptd) Several days   6. Feeling bad about yourself - or that you are a failure or have let yourself or your family down -  (Patient-Rptd) Several days   7. Trouble concentrating on things, such as reading the newspaper or watching television - (Patient-Rptd) Several days   8. Moving or speaking so slowly that other people could have noticed? Or the opposite - being so fidgety or restless that you have been moving around a lot more than usual (Patient-Rptd) Several days   9. Thoughts that you would be better off dead or of hurting  yourself in some way (Patient-Rptd) Several days   Total Score: (Patient-Rptd) 9     If you checked off any problems, how difficult have these problems made it for you to do your work, take care of things at home, or get along with other people?      Developed by Tawanna Irvin, Ivon Veloz, Rico Penaloza and colleagues, with an educational jack from [employer] No permission required to reproduce, translate, display or distribute. permission required to reproduce, translate, display or distribute.     {Provider Documentation  Link to [facility]) Flowsheet :[id number]       Acute Illness    Onset/Duration: 10/2024  Symptoms:  Fever: No  Chills/Sweats: No  Headache (location?): YES  Sinus Pressure: YES  Conjunctivitis:  No  Ear Pain: YES- pressure  Rhinorrhea: YES  Congestion: YES  Sore Throat: No  Cough: YES-productive of green sputum  Wheeze: YES  Decreased Appetite: No  Nausea:  "YES  Vomiting: YES  Diarrhea: YES  Dysuria/Freq.: No  Dysuria or Hematuria: No  Fatigue/Achiness: YES              Objective    /76 (BP Location: Right arm, Patient Position: Sitting)   Pulse 91   Temp 99.1  F (37.3  C)   Ht 1.676 m (5' 6\")   Wt 66.9 kg (147 lb 8 oz)   SpO2 97%   BMI 23.81 kg/m    Body mass index is 23.81 kg/m .  Physical Exam   GENERAL: alert and no distress  EYES: Eyes grossly normal to inspection, PERRL and conjunctivae and sclerae normal  HENT: ear canals and TM's normal, nose and mouth without ulcers or lesions  NECK: no adenopathy, no asymmetry, masses, or scars  RESP: lungs clear to auscultation - no rales, rhonchi or wheezes  CV: regular rate and rhythm, normal S1 S2, no S3 or S4, no murmur, click or rub, no peripheral edema  ABDOMEN: soft, nontender, no hepatosplenomegaly, no masses and bowel sounds normal  MS: no gross musculoskeletal defects noted, no edema  SKIN: no suspicious lesions or rashes  NEURO: Normal strength and tone, mentation intact and speech normal  PSYCH: mentation appears normal, affect normal/bright            Signed Electronically by: CLEMENTE Garcia CNP    "

## 2024-11-27 ENCOUNTER — TELEPHONE (OUTPATIENT)
Dept: FAMILY MEDICINE | Facility: CLINIC | Age: 34
End: 2024-11-27
Payer: MEDICAID

## 2024-12-17 ENCOUNTER — OFFICE VISIT (OUTPATIENT)
Dept: FAMILY MEDICINE | Facility: CLINIC | Age: 34
End: 2024-12-17
Payer: MEDICAID

## 2024-12-17 VITALS
SYSTOLIC BLOOD PRESSURE: 126 MMHG | HEIGHT: 66 IN | TEMPERATURE: 98.1 F | RESPIRATION RATE: 16 BRPM | HEART RATE: 90 BPM | BODY MASS INDEX: 22.68 KG/M2 | OXYGEN SATURATION: 100 % | WEIGHT: 141.1 LBS | DIASTOLIC BLOOD PRESSURE: 87 MMHG

## 2024-12-17 DIAGNOSIS — H69.91 DYSFUNCTION OF RIGHT EUSTACHIAN TUBE: Primary | ICD-10-CM

## 2024-12-17 PROCEDURE — 99213 OFFICE O/P EST LOW 20 MIN: CPT | Performed by: FAMILY MEDICINE

## 2024-12-17 NOTE — PROGRESS NOTES
"  Assessment & Plan     Dysfunction of right eustachian tube  Patient has a resolving URI with eustachian tube dysfunction.  Continue symptomatic treatment.  If no improvement in the next few weeks consider nasal steroids                Subjective   Kareen is a 34 year old, presenting for the following health issues:  Ear Problem (C/o possible ear infection--bilateral ear pain, also c/o nasal congesion, drainage.)      12/17/2024    10:31 AM   Additional Questions   Roomed by Lucretia TIPTON CMA   Accompanied by Self     Ear Problem    History of Present Illness       Reason for visit:  Ear infection    He eats 0-1 servings of fruits and vegetables daily.He consumes 2 sweetened beverage(s) daily.He exercises with enough effort to increase his heart rate 9 or less minutes per day.  He exercises with enough effort to increase his heart rate 3 or less days per week.   He is taking medications regularly.                 Objective    /87 (BP Location: Right arm, Patient Position: Sitting, Cuff Size: Adult Regular)   Pulse 90   Temp 98.1  F (36.7  C) (Tympanic)   Resp 16   Ht 1.676 m (5' 6\")   Wt 64 kg (141 lb 1.6 oz)   SpO2 100%   BMI 22.77 kg/m    Body mass index is 22.77 kg/m .  Physical Exam   Alert, oriented, no acute distress  Ear canals are patent, TMs appear clear with serous effusion on the right throat is clear  Neck is supple without adenopathy  Lungs clear            Signed Electronically by: Brian Jarvis MD    "

## 2025-05-22 ENCOUNTER — OFFICE VISIT (OUTPATIENT)
Dept: FAMILY MEDICINE | Facility: CLINIC | Age: 35
End: 2025-05-22
Payer: MEDICAID

## 2025-05-22 VITALS
DIASTOLIC BLOOD PRESSURE: 76 MMHG | RESPIRATION RATE: 16 BRPM | HEART RATE: 105 BPM | SYSTOLIC BLOOD PRESSURE: 113 MMHG | TEMPERATURE: 96.3 F | BODY MASS INDEX: 21.98 KG/M2 | HEIGHT: 66 IN | OXYGEN SATURATION: 100 % | WEIGHT: 136.8 LBS

## 2025-05-22 DIAGNOSIS — J30.1 SEASONAL ALLERGIC RHINITIS DUE TO POLLEN: Primary | ICD-10-CM

## 2025-05-22 RX ORDER — AZELASTINE 1 MG/ML
1 SPRAY, METERED NASAL 2 TIMES DAILY
Qty: 30 ML | Refills: 1 | Status: SHIPPED | OUTPATIENT
Start: 2025-05-22

## 2025-05-22 ASSESSMENT — ENCOUNTER SYMPTOMS: WHEEZING: 1

## 2025-05-22 NOTE — PROGRESS NOTES
"  Assessment & Plan     Seasonal allergic rhinitis due to pollen  Trial of azelastine nasal spray along with oral antihistamines.  If this is not improving his symptoms he will let us know.  - azelastine (ASTELIN) 0.1 % nasal spray; Spray 1 spray into both nostrils 2 times daily.          Nicotine/Tobacco Cessation  He reports that he has been smoking cigarettes. He started smoking about 21 months ago. He has a 0.5 pack-year smoking history. He has been exposed to tobacco smoke. He has never used smokeless tobacco.  Nicotine/Tobacco Cessation Plan  Self help information given to patient            Subjective   Kareen is a 34 year old, presenting for the following health issues:  Allergies (Discuss issues with allergies, sinus congestion/pressure--worse with high pollen levels.)      5/22/2025     9:36 AM   Additional Questions   Roomed by Lucretia TIPTON CMA   Accompanied by self     Allergies    History of Present Illness       Reason for visit:  Sinus stuffs    He eats 0-1 servings of fruits and vegetables daily.He consumes 2 sweetened beverage(s) daily.He exercises with enough effort to increase his heart rate 20 to 29 minutes per day.  He exercises with enough effort to increase his heart rate 3 or less days per week.   He is taking medications regularly.      Patient is here with ongoing sinus congestion and pressure.  He has a problem with seasonal allergies.  He did not tolerate nasal steroids in the past.  He denies itchy watery eyes.  He has some congestion in his left ear.            Objective    /76 (BP Location: Right arm, Patient Position: Sitting, Cuff Size: Adult Regular)   Pulse 105   Temp (!) 96.3  F (35.7  C) (Tympanic)   Resp 16   Ht 1.676 m (5' 6\")   Wt 62.1 kg (136 lb 12.8 oz)   SpO2 100%   BMI 22.08 kg/m    Body mass index is 22.08 kg/m .  Physical Exam     Alert, oriented, no acute distress  Ear canals are patent serous effusion on the left right is clear   throat is clear with " postpharyngeal drainage   neck is supple without adenopathy   lungs are clear   heart has a regular rate and rhythm          Signed Electronically by: Brian Jarvis MD

## 2025-06-23 ENCOUNTER — OFFICE VISIT (OUTPATIENT)
Dept: FAMILY MEDICINE | Facility: CLINIC | Age: 35
End: 2025-06-23
Payer: MEDICAID

## 2025-06-23 VITALS
BODY MASS INDEX: 21.79 KG/M2 | TEMPERATURE: 97.3 F | HEART RATE: 94 BPM | HEIGHT: 66 IN | WEIGHT: 135.6 LBS | OXYGEN SATURATION: 98 % | SYSTOLIC BLOOD PRESSURE: 147 MMHG | RESPIRATION RATE: 16 BRPM | DIASTOLIC BLOOD PRESSURE: 92 MMHG

## 2025-06-23 DIAGNOSIS — G43.709 CHRONIC MIGRAINE WITHOUT AURA WITHOUT STATUS MIGRAINOSUS, NOT INTRACTABLE: ICD-10-CM

## 2025-06-23 DIAGNOSIS — H69.91 DYSFUNCTION OF RIGHT EUSTACHIAN TUBE: ICD-10-CM

## 2025-06-23 DIAGNOSIS — J30.1 SEASONAL ALLERGIC RHINITIS DUE TO POLLEN: Primary | ICD-10-CM

## 2025-06-23 PROCEDURE — 99214 OFFICE O/P EST MOD 30 MIN: CPT | Performed by: FAMILY MEDICINE

## 2025-06-23 RX ORDER — TOPIRAMATE 25 MG/1
50 TABLET, FILM COATED ORAL DAILY
Qty: 60 TABLET | Refills: 2 | Status: SHIPPED | OUTPATIENT
Start: 2025-06-23

## 2025-06-23 NOTE — LETTER
2025    Kareen Jones   1990        To Whom it May Concern;    Please excuse Kareen Jones from work for a healthcare visit on 2025.  He is currently being treated for sinus and migraine issues.    Sincerely,        Brian Jarvis MD

## 2025-06-23 NOTE — PROGRESS NOTES
Assessment & Plan     Seasonal allergic rhinitis due to pollen  Referral for ENT has been placed due to his persistent symptoms.  Continue with azelastine.  I have asked him to add an over-the-counter antihistamine such as loratadine or cetirizine.  - Adult ENT  Referral; Future    Dysfunction of right eustachian tube  - Adult ENT  Referral; Future    Chronic migraine without aura without status migrainosus, not intractable  Patient history and exam are consistent with intermittent migraine.  Given the frequency of his symptoms we will start preventative treatment with topiramate 50 mg daily.  Follow-up in 1 month or sooner to assess effectiveness.  He may need abortive therapy other than Excedrin Migraine.  - topiramate (TOPAMAX) 25 MG tablet; Take 2 tablets (50 mg) by mouth daily.                Nellie Mathur is a 34 year old, presenting for the following health issues:  Sinus Problem (Discuss issues with sinuses, would like referral to ENT)      6/23/2025    11:42 AM   Additional Questions   Roomed by Lucretia TIPTON CMA   Accompanied by self     Sinus Problem     History of Present Illness       Reason for visit:  Sinus    He eats 0-1 servings of fruits and vegetables daily.He consumes 2 sweetened beverage(s) daily.He exercises with enough effort to increase his heart rate 9 or less minutes per day.  He exercises with enough effort to increase his heart rate 3 or less days per week.   He is taking medications regularly.      Patient is here with ongoing concerns regarding sinus pressure, congestion, decreased hearing retro-orbital headache.  Headaches occur several times a week.  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  There is no significant history of migraine.  Does not recall any specific triggers for the symptoms.  Atypical migraine with suggested about a year ago by ENT.            Objective    BP (!) 147/92 (BP Location: Right arm, Patient Position:  "Sitting, Cuff Size: Adult Regular)   Pulse 94   Temp 97.3  F (36.3  C) (Tympanic)   Resp 16   Ht 1.676 m (5' 6\")   Wt 61.5 kg (135 lb 9.6 oz)   SpO2 98%   BMI 21.89 kg/m    Body mass index is 21.89 kg/m .  Physical Exam   GENERAL: alert and no distress  EYES: Eyes grossly normal to inspection, PERRL and conjunctivae and sclerae normal  HENT: ear canals and TM's normal, nose and mouth without ulcers or lesions  NECK: no adenopathy, no asymmetry, masses, or scars  RESP: lungs clear to auscultation - no rales, rhonchi or wheezes  CV: regular rate and rhythm, normal S1 S2, no S3 or S4, no murmur, click or rub, no peripheral edema  MS: no gross musculoskeletal defects noted, no edema  NEURO: Normal strength and tone, mentation intact and speech normal            Signed Electronically by: Brian Jarvis MD    "

## 2025-06-24 ENCOUNTER — PATIENT OUTREACH (OUTPATIENT)
Dept: CARE COORDINATION | Facility: CLINIC | Age: 35
End: 2025-06-24
Payer: MEDICAID

## 2025-06-26 ENCOUNTER — PATIENT OUTREACH (OUTPATIENT)
Dept: CARE COORDINATION | Facility: CLINIC | Age: 35
End: 2025-06-26
Payer: MEDICAID